# Patient Record
Sex: MALE | Race: OTHER | HISPANIC OR LATINO | URBAN - METROPOLITAN AREA
[De-identification: names, ages, dates, MRNs, and addresses within clinical notes are randomized per-mention and may not be internally consistent; named-entity substitution may affect disease eponyms.]

---

## 2017-11-27 ENCOUNTER — INPATIENT (INPATIENT)
Facility: HOSPITAL | Age: 32
LOS: 1 days | Discharge: HOME | DRG: 511 | End: 2017-11-29
Attending: ORTHOPAEDIC SURGERY | Admitting: ORTHOPAEDIC SURGERY
Payer: OTHER MISCELLANEOUS

## 2017-11-27 VITALS
RESPIRATION RATE: 16 BRPM | HEART RATE: 77 BPM | TEMPERATURE: 98 F | SYSTOLIC BLOOD PRESSURE: 146 MMHG | DIASTOLIC BLOOD PRESSURE: 95 MMHG | OXYGEN SATURATION: 97 %

## 2017-11-27 DIAGNOSIS — S52.502A UNSPECIFIED FRACTURE OF THE LOWER END OF LEFT RADIUS, INITIAL ENCOUNTER FOR CLOSED FRACTURE: ICD-10-CM

## 2017-11-27 DIAGNOSIS — M54.2 CERVICALGIA: ICD-10-CM

## 2017-11-27 DIAGNOSIS — M54.6 PAIN IN THORACIC SPINE: ICD-10-CM

## 2017-11-27 DIAGNOSIS — S52.501A UNSPECIFIED FRACTURE OF THE LOWER END OF RIGHT RADIUS, INITIAL ENCOUNTER FOR CLOSED FRACTURE: ICD-10-CM

## 2017-11-27 LAB
ALBUMIN SERPL ELPH-MCNC: 4.4 G/DL — SIGNIFICANT CHANGE UP (ref 3.4–5)
ALP SERPL-CCNC: 95 U/L — SIGNIFICANT CHANGE UP (ref 40–120)
ALT FLD-CCNC: 36 U/L — SIGNIFICANT CHANGE UP (ref 12–42)
ANION GAP SERPL CALC-SCNC: 12 MMOL/L — SIGNIFICANT CHANGE UP (ref 9–16)
APTT BLD: 32.6 SEC — SIGNIFICANT CHANGE UP (ref 27.5–36.5)
AST SERPL-CCNC: 18 U/L — SIGNIFICANT CHANGE UP (ref 15–37)
BASOPHILS NFR BLD AUTO: 0.8 % — SIGNIFICANT CHANGE UP (ref 0–2)
BILIRUB SERPL-MCNC: 0.2 MG/DL — SIGNIFICANT CHANGE UP (ref 0.2–1.2)
BUN SERPL-MCNC: 18 MG/DL — SIGNIFICANT CHANGE UP (ref 7–23)
CALCIUM SERPL-MCNC: 9.7 MG/DL — SIGNIFICANT CHANGE UP (ref 8.5–10.5)
CHLORIDE SERPL-SCNC: 102 MMOL/L — SIGNIFICANT CHANGE UP (ref 96–108)
CO2 SERPL-SCNC: 26 MMOL/L — SIGNIFICANT CHANGE UP (ref 22–31)
CREAT SERPL-MCNC: 0.98 MG/DL — SIGNIFICANT CHANGE UP (ref 0.5–1.3)
EOSINOPHIL NFR BLD AUTO: 0.9 % — SIGNIFICANT CHANGE UP (ref 0–6)
GLUCOSE SERPL-MCNC: 115 MG/DL — HIGH (ref 70–99)
HCT VFR BLD CALC: 43.4 % — SIGNIFICANT CHANGE UP (ref 39–50)
HGB BLD-MCNC: 15.1 G/DL — SIGNIFICANT CHANGE UP (ref 13–17)
IMM GRANULOCYTES NFR BLD AUTO: 0.6 % — SIGNIFICANT CHANGE UP (ref 0–1.5)
INR BLD: 0.96 — SIGNIFICANT CHANGE UP (ref 0.88–1.16)
LYMPHOCYTES # BLD AUTO: 35.6 % — SIGNIFICANT CHANGE UP (ref 13–44)
MCHC RBC-ENTMCNC: 30.5 PG — SIGNIFICANT CHANGE UP (ref 27–34)
MCHC RBC-ENTMCNC: 34.8 G/DL — SIGNIFICANT CHANGE UP (ref 32–36)
MCV RBC AUTO: 87.7 FL — SIGNIFICANT CHANGE UP (ref 80–100)
MONOCYTES NFR BLD AUTO: 6.3 % — SIGNIFICANT CHANGE UP (ref 2–14)
NEUTROPHILS NFR BLD AUTO: 55.8 % — SIGNIFICANT CHANGE UP (ref 43–77)
PCO2 BLDV: 45 MMHG — SIGNIFICANT CHANGE UP (ref 41–51)
PH BLDV: 7.37 — SIGNIFICANT CHANGE UP (ref 7.32–7.43)
PLATELET # BLD AUTO: 272 K/UL — SIGNIFICANT CHANGE UP (ref 150–400)
PO2 BLDV: 84 MMHG — HIGH (ref 35–40)
POTASSIUM SERPL-MCNC: 3.7 MMOL/L — SIGNIFICANT CHANGE UP (ref 3.5–5.3)
POTASSIUM SERPL-SCNC: 3.7 MMOL/L — SIGNIFICANT CHANGE UP (ref 3.5–5.3)
PROT SERPL-MCNC: 7.9 G/DL — SIGNIFICANT CHANGE UP (ref 6.4–8.2)
PROTHROM AB SERPL-ACNC: 10.6 SEC — SIGNIFICANT CHANGE UP (ref 9.8–12.7)
RBC # BLD: 4.95 M/UL — SIGNIFICANT CHANGE UP (ref 4.2–5.8)
RBC # FLD: 11.6 % — SIGNIFICANT CHANGE UP (ref 10.3–16.9)
SAO2 % BLDV: 97 % — SIGNIFICANT CHANGE UP
SODIUM SERPL-SCNC: 140 MMOL/L — SIGNIFICANT CHANGE UP (ref 132–145)
WBC # BLD: 10.8 K/UL — HIGH (ref 3.8–10.5)
WBC # FLD AUTO: 10.8 K/UL — HIGH (ref 3.8–10.5)

## 2017-11-27 PROCEDURE — 73552 X-RAY EXAM OF FEMUR 2/>: CPT | Mod: 26,RT

## 2017-11-27 PROCEDURE — 74177 CT ABD & PELVIS W/CONTRAST: CPT | Mod: 26

## 2017-11-27 PROCEDURE — 70450 CT HEAD/BRAIN W/O DYE: CPT | Mod: 26

## 2017-11-27 PROCEDURE — 29125 APPL SHORT ARM SPLINT STATIC: CPT | Mod: LT

## 2017-11-27 PROCEDURE — 73564 X-RAY EXAM KNEE 4 OR MORE: CPT | Mod: 26,RT

## 2017-11-27 PROCEDURE — 99291 CRITICAL CARE FIRST HOUR: CPT | Mod: 25

## 2017-11-27 PROCEDURE — 71260 CT THORAX DX C+: CPT | Mod: 26

## 2017-11-27 PROCEDURE — 72170 X-RAY EXAM OF PELVIS: CPT | Mod: 26

## 2017-11-27 PROCEDURE — 73090 X-RAY EXAM OF FOREARM: CPT | Mod: 26,50

## 2017-11-27 PROCEDURE — 73590 X-RAY EXAM OF LOWER LEG: CPT | Mod: 26,RT

## 2017-11-27 PROCEDURE — 99285 EMERGENCY DEPT VISIT HI MDM: CPT | Mod: 57,25

## 2017-11-27 PROCEDURE — 71010: CPT | Mod: 26

## 2017-11-27 PROCEDURE — 72131 CT LUMBAR SPINE W/O DYE: CPT | Mod: 26

## 2017-11-27 PROCEDURE — 72128 CT CHEST SPINE W/O DYE: CPT | Mod: 26

## 2017-11-27 PROCEDURE — 73110 X-RAY EXAM OF WRIST: CPT | Mod: 26,LT,RT

## 2017-11-27 PROCEDURE — 73080 X-RAY EXAM OF ELBOW: CPT | Mod: 26,50

## 2017-11-27 PROCEDURE — 73110 X-RAY EXAM OF WRIST: CPT | Mod: 26,50,76

## 2017-11-27 PROCEDURE — 25605 CLTX DST RDL FX/EPHYS SEP W/: CPT | Mod: RT

## 2017-11-27 PROCEDURE — 73080 X-RAY EXAM OF ELBOW: CPT | Mod: 26,LT,RT

## 2017-11-27 PROCEDURE — 72125 CT NECK SPINE W/O DYE: CPT | Mod: 26

## 2017-11-27 RX ORDER — MORPHINE SULFATE 50 MG/1
2 CAPSULE, EXTENDED RELEASE ORAL EVERY 4 HOURS
Qty: 0 | Refills: 0 | Status: DISCONTINUED | OUTPATIENT
Start: 2017-11-27 | End: 2017-11-29

## 2017-11-27 RX ORDER — HYDROMORPHONE HYDROCHLORIDE 2 MG/ML
1 INJECTION INTRAMUSCULAR; INTRAVENOUS; SUBCUTANEOUS ONCE
Qty: 0 | Refills: 0 | Status: DISCONTINUED | OUTPATIENT
Start: 2017-11-27 | End: 2017-11-27

## 2017-11-27 RX ORDER — METOCLOPRAMIDE HCL 10 MG
10 TABLET ORAL EVERY 6 HOURS
Qty: 0 | Refills: 0 | Status: DISCONTINUED | OUTPATIENT
Start: 2017-11-27 | End: 2017-11-29

## 2017-11-27 RX ORDER — DOCUSATE SODIUM 100 MG
100 CAPSULE ORAL THREE TIMES A DAY
Qty: 0 | Refills: 0 | Status: DISCONTINUED | OUTPATIENT
Start: 2017-11-27 | End: 2017-11-29

## 2017-11-27 RX ORDER — ACETAMINOPHEN 500 MG
650 TABLET ORAL EVERY 6 HOURS
Qty: 0 | Refills: 0 | Status: DISCONTINUED | OUTPATIENT
Start: 2017-11-27 | End: 2017-11-29

## 2017-11-27 RX ORDER — INFLUENZA VIRUS VACCINE 15; 15; 15; 15 UG/.5ML; UG/.5ML; UG/.5ML; UG/.5ML
0.5 SUSPENSION INTRAMUSCULAR ONCE
Qty: 0 | Refills: 0 | Status: COMPLETED | OUTPATIENT
Start: 2017-11-27 | End: 2017-11-27

## 2017-11-27 RX ORDER — ONDANSETRON 8 MG/1
4 TABLET, FILM COATED ORAL ONCE
Qty: 0 | Refills: 0 | Status: COMPLETED | OUTPATIENT
Start: 2017-11-27 | End: 2017-11-27

## 2017-11-27 RX ORDER — OXYCODONE HYDROCHLORIDE 5 MG/1
10 TABLET ORAL EVERY 4 HOURS
Qty: 0 | Refills: 0 | Status: DISCONTINUED | OUTPATIENT
Start: 2017-11-27 | End: 2017-11-29

## 2017-11-27 RX ORDER — MORPHINE SULFATE 50 MG/1
4 CAPSULE, EXTENDED RELEASE ORAL ONCE
Qty: 0 | Refills: 0 | Status: DISCONTINUED | OUTPATIENT
Start: 2017-11-27 | End: 2017-11-27

## 2017-11-27 RX ORDER — SODIUM CHLORIDE 9 MG/ML
1000 INJECTION, SOLUTION INTRAVENOUS
Qty: 0 | Refills: 0 | Status: DISCONTINUED | OUTPATIENT
Start: 2017-11-27 | End: 2017-11-29

## 2017-11-27 RX ORDER — HYDROMORPHONE HYDROCHLORIDE 2 MG/ML
0.5 INJECTION INTRAMUSCULAR; INTRAVENOUS; SUBCUTANEOUS EVERY 4 HOURS
Qty: 0 | Refills: 0 | Status: DISCONTINUED | OUTPATIENT
Start: 2017-11-27 | End: 2017-11-29

## 2017-11-27 RX ORDER — OXYCODONE HYDROCHLORIDE 5 MG/1
5 TABLET ORAL EVERY 4 HOURS
Qty: 0 | Refills: 0 | Status: DISCONTINUED | OUTPATIENT
Start: 2017-11-27 | End: 2017-11-29

## 2017-11-27 RX ADMIN — HYDROMORPHONE HYDROCHLORIDE 1 MILLIGRAM(S): 2 INJECTION INTRAMUSCULAR; INTRAVENOUS; SUBCUTANEOUS at 18:24

## 2017-11-27 RX ADMIN — MORPHINE SULFATE 4 MILLIGRAM(S): 50 CAPSULE, EXTENDED RELEASE ORAL at 16:06

## 2017-11-27 RX ADMIN — MORPHINE SULFATE 4 MILLIGRAM(S): 50 CAPSULE, EXTENDED RELEASE ORAL at 15:43

## 2017-11-27 RX ADMIN — HYDROMORPHONE HYDROCHLORIDE 0.5 MILLIGRAM(S): 2 INJECTION INTRAMUSCULAR; INTRAVENOUS; SUBCUTANEOUS at 23:00

## 2017-11-27 RX ADMIN — HYDROMORPHONE HYDROCHLORIDE 0.5 MILLIGRAM(S): 2 INJECTION INTRAMUSCULAR; INTRAVENOUS; SUBCUTANEOUS at 22:39

## 2017-11-27 RX ADMIN — HYDROMORPHONE HYDROCHLORIDE 1 MILLIGRAM(S): 2 INJECTION INTRAMUSCULAR; INTRAVENOUS; SUBCUTANEOUS at 18:19

## 2017-11-27 RX ADMIN — HYDROMORPHONE HYDROCHLORIDE 1 MILLIGRAM(S): 2 INJECTION INTRAMUSCULAR; INTRAVENOUS; SUBCUTANEOUS at 21:07

## 2017-11-27 RX ADMIN — ONDANSETRON 4 MILLIGRAM(S): 8 TABLET, FILM COATED ORAL at 21:12

## 2017-11-27 RX ADMIN — HYDROMORPHONE HYDROCHLORIDE 1 MILLIGRAM(S): 2 INJECTION INTRAMUSCULAR; INTRAVENOUS; SUBCUTANEOUS at 17:48

## 2017-11-27 RX ADMIN — HYDROMORPHONE HYDROCHLORIDE 1 MILLIGRAM(S): 2 INJECTION INTRAMUSCULAR; INTRAVENOUS; SUBCUTANEOUS at 16:04

## 2017-11-27 NOTE — ED PROVIDER NOTE - DIAGNOSTIC INTERPRETATION
ER Physician: Huber Bailey CHEST XRAY INTERPRETATION: lungs clear, heart shadow normal, bony structures intact, ER Physician: Huber Bailey BILATERAL WRIST INTERPRETATION:  distal radius fracture to L, distal radius and ulna fx to R, soft tissue swelling noted; abnormal bony alignment. ER Physician: Huber Bailey PELVIS XRAY INTERPRETATION:  no acute fracture; no soft tissue swelling noted; normal bony alignment. ER Physician: Huber Bailey BL ELBOW XRAY INTERPRETATION:  no acute fracture; no soft tissue swelling noted; normal bony alignment.   ER Physician: Huber BARKER TIBFIB XRAY INTERPRETATION:  no acute fracture; no soft tissue swelling noted; normal bony alignment. ER Physician: Huber BARKER FEMUR XRAY INTERPRETATION:  no acute fracture; no soft tissue swelling noted; normal bony alignment.

## 2017-11-27 NOTE — PROGRESS NOTE ADULT - SUBJECTIVE AND OBJECTIVE BOX
Ortho Preop Note    Patient is a 32y old  Male who presents with a chief complaint of   Diagnosis: b/l DR Champion  Procedure: B/L DR MRAIEE  Surgeon: Aminata                          15.1   10.8  )-----------( 272      ( 27 Nov 2017 15:41 )             43.4     11-27    140  |  102  |  18  ----------------------------<  115<H>  3.7   |  26  |  0.98    Ca    9.7      27 Nov 2017 15:41    TPro  7.9  /  Alb  4.4  /  TBili  0.2  /  DBili  x   /  AST  18  /  ALT  36  /  AlkPhos  95  11-27    PT/INR - ( 27 Nov 2017 15:41 )   PT: 10.6 sec;   INR: 0.96          PTT - ( 27 Nov 2017 15:41 )  PTT:32.6 sec      [ ] Type & Screen  [x ] CBC  [x ] BMP  [x ] PT/PTT/INR  [ ] Urinalysis  [x ] Chest X-ray  [x ] EKG  [x ] NPO/IVF  [x ] Consent  [ ] Clearance in am  [ x] Added on to OR Schedule  [ x] Anti-coagulation held      Assessment & Plan:  32yMale with  patito/l DR fxs  -For OR 11/27

## 2017-11-27 NOTE — ED PROVIDER NOTE - CARE PLAN
Principal Discharge DX:	Distal radius fracture  Goal:	left and right  Secondary Diagnosis:	Ulnar fracture  Secondary Diagnosis:	Fall

## 2017-11-27 NOTE — ED ADULT NURSE REASSESSMENT NOTE - NS ED NURSE REASSESS COMMENT FT1
Patient continues to complaint of generalized pain to lower back and arms. Patient hemodynamically stable. Continues to complain of pain. MD Bailey notified and provided verbal order for 1mg of IV Hydromorphone. Patient medicated per order. Will continue to monitor. Patient presents alert and oriented X 4. Primarily Brazilian speaking. HEENT intact. +PERRLA. MMM, Trachea midline. No C-Spine tenderness. C-Collar placed. No JVD. Patient LS clear to ausculation X 4. Equal bilateral chest rise. S1, S2. No crepitus or signs of trauma to chest. Abdomen soft and tender to palpation. Abrasion to right back. +BS X 4. Skin warm, dry, unremarkable, and intact. +Circulation to all four extremities with decreased motor strength secondary to pain. Obvious deformity and to right wrist. Left wrist with pain. Pelvis intact, no crepitus. Patient with pain to right knee. Abrasion right knee and left lower shin. Patient provided for emotional support, comfort, frequent rounding, and review of plan of care. Will continue to monitor. Patient continues to complaint of generalized pain to lower back and arms. Patient hemodynamically stable. Continues to complain of pain. MD Bailey notified and provided verbal order for 1mg of IV Hydromorphone. Patient medicated per order. Will continue to monitor. Patient presents alert and oriented X 4. Primarily Citizen of [place] speaking. HEENT intact. +PERRLA. MMM, Trachea midline. No C-Spine tenderness. C-Collar placed. No JVD. Patient LS clear to ausculation X 4. Equal bilateral chest rise. S1, S2. No crepitus or signs of trauma to chest. Abdomen soft and tender to palpation. Abrasion to right back. +BS X 4. Skin warm, dry, unremarkable, and intact other than documented below. +Circulation to all four extremities with decreased motor strength secondary to pain. Obvious deformity and to right wrist. Left wrist with pain. Pelvis intact, no crepitus. Patient with pain to right knee. Abrasion right knee and left lower shin. Patient provided for emotional support, comfort, frequent rounding, and review of plan of care. Will continue to monitor.

## 2017-11-27 NOTE — ED PROVIDER NOTE - PHYSICAL EXAMINATION
CON: ao x 3, HENMT: clear oropharynx, soft neck, no cervical midline tenderness or stepoff, c-spine collar applied, HEAD: no palpable hematoma, CV: rrr, equal pulses b/l, RESP: cta b/l, GI: +BS, soft, nontender, no rebound, no guarding, SKIN: noted abrasion to upper back, MSK: deformity noted to R wrist, pulses intact, mild swelling noted to L wrist, pulses intact, b/l pedal pulses intact, pelvis stable, no deformity noted to LE b/l, NEURO: ao x 4, follows commands

## 2017-11-27 NOTE — ED PROVIDER NOTE - PROGRESS NOTE DETAILS
pt initially fell from height, eval for traumatic injury, CT w/o acute intrathoracic or intraabd injury, spine films w/o acute fx, appears only isolated wrist/orthopedic injury, no indication for additional trauma eval/admission, d/w orthopedic attending in house, feels comfortable w admission for operative management of wrist

## 2017-11-27 NOTE — ED PROVIDER NOTE - MEDICAL DECISION MAKING DETAILS
abc intact, pulses intact in b/l UE and LE extremities, pt placed in c-spine precaution upon arrival, no c/t/l midline stepoff or deformity, rectal exam w/ tone no blood, soft abd, no guarding, pelvis stable, will need trauma workup, xray extremities and pelvis and chest, ortho consult as needed

## 2017-11-27 NOTE — ED ADULT TRIAGE NOTE - CHIEF COMPLAINT QUOTE
Patient feel 8-10ft off a scaffold, landing prone. Bilateral arm pain and deformity. Wear hard hat at time

## 2017-11-27 NOTE — CONSULT NOTE ADULT - ATTENDING COMMENTS
I personally performed the above history and physical.  With IV pain medications and a hematoma block, a closed reduction of bilateral distal radius fractures was performed with the assistance of Dr. Garry Casas.  Dr. Casas assisted in splint application and molding bilaterally.  Following placement of splints, neurovascular exam was performed and remained within normal limits bilaterally.    Ja Coates MD

## 2017-11-28 DIAGNOSIS — Z01.818 ENCOUNTER FOR OTHER PREPROCEDURAL EXAMINATION: ICD-10-CM

## 2017-11-28 DIAGNOSIS — S52.502A UNSPECIFIED FRACTURE OF THE LOWER END OF LEFT RADIUS, INITIAL ENCOUNTER FOR CLOSED FRACTURE: ICD-10-CM

## 2017-11-28 DIAGNOSIS — S52.501A UNSPECIFIED FRACTURE OF THE LOWER END OF RIGHT RADIUS, INITIAL ENCOUNTER FOR CLOSED FRACTURE: ICD-10-CM

## 2017-11-28 LAB
ANION GAP SERPL CALC-SCNC: 17 MMOL/L — SIGNIFICANT CHANGE UP (ref 5–17)
APPEARANCE UR: CLEAR — SIGNIFICANT CHANGE UP
BASOPHILS NFR BLD AUTO: 0.2 % — SIGNIFICANT CHANGE UP (ref 0–2)
BILIRUB UR-MCNC: NEGATIVE — SIGNIFICANT CHANGE UP
BLD GP AB SCN SERPL QL: NEGATIVE — SIGNIFICANT CHANGE UP
BUN SERPL-MCNC: 17 MG/DL — SIGNIFICANT CHANGE UP (ref 7–23)
CALCIUM SERPL-MCNC: 9.2 MG/DL — SIGNIFICANT CHANGE UP (ref 8.4–10.5)
CHLORIDE SERPL-SCNC: 94 MMOL/L — LOW (ref 96–108)
CO2 SERPL-SCNC: 25 MMOL/L — SIGNIFICANT CHANGE UP (ref 22–31)
COLOR SPEC: YELLOW — SIGNIFICANT CHANGE UP
CREAT SERPL-MCNC: 0.8 MG/DL — SIGNIFICANT CHANGE UP (ref 0.5–1.3)
DIFF PNL FLD: NEGATIVE — SIGNIFICANT CHANGE UP
EOSINOPHIL NFR BLD AUTO: 0.3 % — SIGNIFICANT CHANGE UP (ref 0–6)
GLUCOSE SERPL-MCNC: 126 MG/DL — HIGH (ref 70–99)
GLUCOSE UR QL: NEGATIVE — SIGNIFICANT CHANGE UP
HCT VFR BLD CALC: 41.2 % — SIGNIFICANT CHANGE UP (ref 39–50)
HGB BLD-MCNC: 14 G/DL — SIGNIFICANT CHANGE UP (ref 13–17)
KETONES UR-MCNC: NEGATIVE — SIGNIFICANT CHANGE UP
LEUKOCYTE ESTERASE UR-ACNC: NEGATIVE — SIGNIFICANT CHANGE UP
LYMPHOCYTES # BLD AUTO: 18.5 % — SIGNIFICANT CHANGE UP (ref 13–44)
MCHC RBC-ENTMCNC: 30.4 PG — SIGNIFICANT CHANGE UP (ref 27–34)
MCHC RBC-ENTMCNC: 34 G/DL — SIGNIFICANT CHANGE UP (ref 32–36)
MCV RBC AUTO: 89.4 FL — SIGNIFICANT CHANGE UP (ref 80–100)
MONOCYTES NFR BLD AUTO: 7.9 % — SIGNIFICANT CHANGE UP (ref 2–14)
NEUTROPHILS NFR BLD AUTO: 73.1 % — SIGNIFICANT CHANGE UP (ref 43–77)
NITRITE UR-MCNC: NEGATIVE — SIGNIFICANT CHANGE UP
PH UR: 5 — SIGNIFICANT CHANGE UP (ref 5–8)
PLATELET # BLD AUTO: 248 K/UL — SIGNIFICANT CHANGE UP (ref 150–400)
POTASSIUM SERPL-MCNC: 3.7 MMOL/L — SIGNIFICANT CHANGE UP (ref 3.5–5.3)
POTASSIUM SERPL-SCNC: 3.7 MMOL/L — SIGNIFICANT CHANGE UP (ref 3.5–5.3)
PROT UR-MCNC: NEGATIVE MG/DL — SIGNIFICANT CHANGE UP
RBC # BLD: 4.61 M/UL — SIGNIFICANT CHANGE UP (ref 4.2–5.8)
RBC # FLD: 12.2 % — SIGNIFICANT CHANGE UP (ref 10.3–16.9)
RH IG SCN BLD-IMP: POSITIVE — SIGNIFICANT CHANGE UP
SODIUM SERPL-SCNC: 136 MMOL/L — SIGNIFICANT CHANGE UP (ref 135–145)
SP GR SPEC: 1.02 — SIGNIFICANT CHANGE UP (ref 1–1.03)
UROBILINOGEN FLD QL: 0.2 E.U./DL — SIGNIFICANT CHANGE UP
WBC # BLD: 11.5 K/UL — HIGH (ref 3.8–10.5)
WBC # FLD AUTO: 11.5 K/UL — HIGH (ref 3.8–10.5)

## 2017-11-28 PROCEDURE — 93010 ELECTROCARDIOGRAM REPORT: CPT

## 2017-11-28 PROCEDURE — 25609 OPTX DST RD XART FX/EP SEP3+: CPT | Mod: 58,RT,GC

## 2017-11-28 PROCEDURE — 99254 IP/OBS CNSLTJ NEW/EST MOD 60: CPT | Mod: GC

## 2017-11-28 PROCEDURE — 73100 X-RAY EXAM OF WRIST: CPT | Mod: 26,50

## 2017-11-28 PROCEDURE — 25608 OPTX DST RD XART FX/EPI SEP2: CPT | Mod: LT,GC

## 2017-11-28 PROCEDURE — 99223 1ST HOSP IP/OBS HIGH 75: CPT | Mod: 57

## 2017-11-28 RX ORDER — HYDROMORPHONE HYDROCHLORIDE 2 MG/ML
0.5 INJECTION INTRAMUSCULAR; INTRAVENOUS; SUBCUTANEOUS
Qty: 0 | Refills: 0 | Status: DISCONTINUED | OUTPATIENT
Start: 2017-11-28 | End: 2017-11-29

## 2017-11-28 RX ORDER — CEFAZOLIN SODIUM 1 G
2000 VIAL (EA) INJECTION EVERY 8 HOURS
Qty: 0 | Refills: 0 | Status: DISCONTINUED | OUTPATIENT
Start: 2017-11-28 | End: 2017-11-28

## 2017-11-28 RX ORDER — CEFAZOLIN SODIUM 1 G
2000 VIAL (EA) INJECTION EVERY 8 HOURS
Qty: 0 | Refills: 0 | Status: COMPLETED | OUTPATIENT
Start: 2017-11-29 | End: 2017-11-29

## 2017-11-28 RX ADMIN — MORPHINE SULFATE 2 MILLIGRAM(S): 50 CAPSULE, EXTENDED RELEASE ORAL at 05:35

## 2017-11-28 RX ADMIN — MORPHINE SULFATE 2 MILLIGRAM(S): 50 CAPSULE, EXTENDED RELEASE ORAL at 09:41

## 2017-11-28 RX ADMIN — OXYCODONE HYDROCHLORIDE 10 MILLIGRAM(S): 5 TABLET ORAL at 01:44

## 2017-11-28 RX ADMIN — OXYCODONE HYDROCHLORIDE 10 MILLIGRAM(S): 5 TABLET ORAL at 07:15

## 2017-11-28 RX ADMIN — OXYCODONE HYDROCHLORIDE 10 MILLIGRAM(S): 5 TABLET ORAL at 00:55

## 2017-11-28 RX ADMIN — HYDROMORPHONE HYDROCHLORIDE 0.5 MILLIGRAM(S): 2 INJECTION INTRAMUSCULAR; INTRAVENOUS; SUBCUTANEOUS at 17:49

## 2017-11-28 RX ADMIN — MORPHINE SULFATE 2 MILLIGRAM(S): 50 CAPSULE, EXTENDED RELEASE ORAL at 05:20

## 2017-11-28 RX ADMIN — MORPHINE SULFATE 2 MILLIGRAM(S): 50 CAPSULE, EXTENDED RELEASE ORAL at 09:03

## 2017-11-28 RX ADMIN — OXYCODONE HYDROCHLORIDE 10 MILLIGRAM(S): 5 TABLET ORAL at 06:38

## 2017-11-28 NOTE — H&P ADULT - ASSESSMENT
A/P  Pt 32yMale  s/p fall with b/l DR sosa  Admit to Orthopaedics  NPO at midnight  IVF  Pain control  Hold anticoagulation for OR  Labs  UA  type and screen  CXR   EKG  Medical Clearance with Dr. Borja  Consented for b/l DR MARIEE  d/w attending

## 2017-11-28 NOTE — H&P ADULT - NSHPLABSRESULTS_GEN_ALL_CORE
Vital Signs Last 24 Hrs  T(C): 36.6 (27 Nov 2017 22:27), Max: 36.9 (27 Nov 2017 17:53)  T(F): 97.8 (27 Nov 2017 22:27), Max: 98.5 (27 Nov 2017 17:53)  HR: 84 (27 Nov 2017 22:27) (65 - 84)  BP: 178/72 (27 Nov 2017 22:27) (130/82 - 178/72)  BP(mean): --  RR: 18 (27 Nov 2017 22:27) (16 - 20)  SpO2: 96% (27 Nov 2017 22:27) (95% - 99%)                          15.1   10.8  )-----------( 272      ( 27 Nov 2017 15:41 )             43.4     11-27    140  |  102  |  18  ----------------------------<  115<H>  3.7   |  26  |  0.98    Ca    9.7      27 Nov 2017 15:41    TPro  7.9  /  Alb  4.4  /  TBili  0.2  /  DBili  x   /  AST  18  /  ALT  36  /  AlkPhos  95  11-27      PT/INR - ( 27 Nov 2017 15:41 )   PT: 10.6 sec;   INR: 0.96          PTT - ( 27 Nov 2017 15:41 )  PTT:32.6 sec    XR b/l wrist: L wrist non-displaced intra-articular DR fx, R wrist comminuted, intra-articular dorsally angulated DR fx    < from: CT Lumbar Spine No Cont (11.27.17 @ 16:52) >    IMPRESSION: -    1. No fracture or subluxation.    2. At L5-S1 level, slight posterior disc bulging is noted.    < end of copied text >      < from: CT Thoracic Spine No Cont (11.27.17 @ 16:58) >      IMPRESSION:-    Negative study.      < end of copied text >      < from: CT Cervical Spine No Cont (11.27.17 @ 16:08) >    IMPRESSION: -    1.  No fracture or subluxation.     2.  Minimal levoscoliosis is noted, probably due to posturing.    < end of copied text >      < from: CT Head No Cont (11.27.17 @ 16:08) >    IMPRESSION:     No acute intracranial hemorrhage or calvarial fracture.    Maxillary sinus inflammatory disease, as above.    < end of copied text >

## 2017-11-28 NOTE — PROGRESS NOTE ADULT - SUBJECTIVE AND OBJECTIVE BOX
Orthopaedics Post Op Check    Procedure: b/l DR MARIEE  Surgeon: Dr Coates    Pt comfortable, without complaints  Denies CP, SOB, N/V, numbness/tingling     Vital Signs Last 24 Hrs  T(C): 37.3 (28 Nov 2017 17:25), Max: 37.6 (28 Nov 2017 05:18)  T(F): 99.2 (28 Nov 2017 17:25), Max: 99.7 (28 Nov 2017 05:18)  HR: 86 (28 Nov 2017 18:40) (80 - 104)  BP: 159/73 (28 Nov 2017 18:40) (130/82 - 178/72)  BP(mean): 103 (28 Nov 2017 18:40) (101 - 108)  RR: 15 (28 Nov 2017 18:40) (14 - 32)  SpO2: 97% (28 Nov 2017 18:40) (94% - 97%)  AVSS, NAD    Dressing C/D/I w/ b/l Splints  General: Pt Alert and oriented   wwp  able to range fingers b/l w/o deficits  silt b/l                             14.0   11.5  )-----------( 248      ( 28 Nov 2017 06:49 )             41.2   11-28    136  |  94<L>  |  17  ----------------------------<  126<H>  3.7   |  25  |  0.80    Ca    9.2      28 Nov 2017 06:49    TPro  7.9  /  Alb  4.4  /  TBili  0.2  /  DBili  x   /  AST  18  /  ALT  36  /  AlkPhos  95  11-27      A/P: 32yMale POD#0 s/p above  - Stable  - Pain Control  - DVT ppx  - Post op abx  - PT, WBS: pwb R, nwb L  - F/U AM Labs Orthopaedics Post Op Check    Procedure: b/l DR MARIEE  Surgeon: Dr Coates    Pt comfortable, without complaints  Denies CP, SOB, N/V, numbness/tingling     Vital Signs Last 24 Hrs  T(C): 37.3 (28 Nov 2017 17:25), Max: 37.6 (28 Nov 2017 05:18)  T(F): 99.2 (28 Nov 2017 17:25), Max: 99.7 (28 Nov 2017 05:18)  HR: 86 (28 Nov 2017 18:40) (80 - 104)  BP: 159/73 (28 Nov 2017 18:40) (130/82 - 178/72)  BP(mean): 103 (28 Nov 2017 18:40) (101 - 108)  RR: 15 (28 Nov 2017 18:40) (14 - 32)  SpO2: 97% (28 Nov 2017 18:40) (94% - 97%)  AVSS, NAD    Dressing C/D/I w/ b/l Splints  General: Pt Alert and oriented   wwp  able to range fingers b/l w/o deficits  silt b/l                             14.0   11.5  )-----------( 248      ( 28 Nov 2017 06:49 )             41.2   11-28    136  |  94<L>  |  17  ----------------------------<  126<H>  3.7   |  25  |  0.80    Ca    9.2      28 Nov 2017 06:49    TPro  7.9  /  Alb  4.4  /  TBili  0.2  /  DBili  x   /  AST  18  /  ALT  36  /  AlkPhos  95  11-27      A/P: 32yMale POD#0 s/p above  - emphasized elevation b/l + added pillows w/ PACU nurse  - Stable  - Pain Control  - DVT ppx  - Post op abx  - PT, WBS: pwb L, nwb R  - F/U AM Labs

## 2017-11-28 NOTE — CONSULT NOTE ADULT - PROBLEM SELECTOR PROBLEM 1
Preoperative clearance
Traumatic closed displaced fracture of distal end of right radius and ulna, initial encounter

## 2017-11-28 NOTE — CONSULT NOTE ADULT - PROBLEM SELECTOR PROBLEM 2
Distal radius fracture, left
Traumatic closed displaced fracture of distal end of radius, left, initial encounter

## 2017-11-28 NOTE — CONSULT NOTE ADULT - SUBJECTIVE AND OBJECTIVE BOX
HPI:  32-year-old adult male presented to the [facility] emergency room after falling from a football scaffolding.  Patient also complained of midthoracic central spinal pain which is made worse with taking deep breaths. No other associated symptoms. No aggravating or alleviating factors. No prior treatment.  Patient denies any other pain at rest. He did report pain with palpation or motion of the cervical spine which is midline in the mid cervical region. Again denies any neurologic deficits to the bilateral upper bilateral lower extremities. He denies any change in vision, smell, taste. Denies any change in balance. Denies any loss of memory.    PMH: Noncontributory    PSH: Right knee surgery unclear what type    NKDA    Social history- 1 month no cigarettes, 3 months no ETOH     Family hx: noncontributory         Patient is a 32y old  Male who presents with a chief complaint of b/l wrist pain (2017 00:08)      INTERVAL HPI/OVERNIGHT EVENTS: No acute overnight events.     Review of Systems: 12 point review of systems otherwise negative  ( - )fevers/chills  ( - ) dyspnea  ( - ) cough  ( - ) chest pain  ( - ) palpatations  ( - ) dizziness/lightheadedness  ( - ) nausea/vomiting  ( - ) abd pain  ( - ) diarrhea  ( - ) melena  ( - ) hematochezia  ( - ) dysuria  ( - ) hematuria  ( - ) leg swelling  ( -) calf tenderness  ( - ) motor weakness  ( - ) extremity numbness  ( - ) back pain  ( + ) tolerating POs  ( + ) BM    MEDICATIONS  (STANDING):  docusate sodium 100 milliGRAM(s) Oral three times a day  influenza   Vaccine 0.5 milliLiter(s) IntraMuscular once  lactated ringers. 1000 milliLiter(s) (80 mL/Hr) IV Continuous <Continuous>    MEDICATIONS  (PRN):  acetaminophen   Tablet 650 milliGRAM(s) Oral every 6 hours PRN For Temp greater than 38 C (100.4 F)  HYDROmorphone  Injectable 0.5 milliGRAM(s) IV Push every 4 hours PRN Breakthrough pain  metoclopramide Injectable 10 milliGRAM(s) IV Push every 6 hours PRN Nausea and/or Vomiting  morphine  - Injectable 2 milliGRAM(s) IV Push every 4 hours PRN Severe Pain (7 - 10)  oxyCODONE    IR 10 milliGRAM(s) Oral every 4 hours PRN Moderate Pain  oxyCODONE    IR 5 milliGRAM(s) Oral every 4 hours PRN Mild Pain      Allergies    No Known Allergies    Intolerances          Vital Signs Last 24 Hrs  T(C): 36.7 (2017 08:37), Max: 37.6 (2017 05:18)  T(F): 98 (2017 08:37), Max: 99.7 (2017 05:18)  HR: 80 (2017 08:37) (65 - 84)  BP: 139/78 (2017 08:37) (130/82 - 178/72)  BP(mean): --  RR: 17 (2017 08:37) (16 - 20)  SpO2: 96% (2017 08:37) (95% - 99%)  CAPILLARY BLOOD GLUCOSE            Physical Exam:    Daily     Daily   General:  Well appearing, NAD, not cachetic  HEENT:  Nonicteric, PERRLA  CV:  RRR, no murmur, no JVD  Lungs:  CTA B/L, no wheezes, rales, rhonchi  Abdomen:  Soft, non-tender, no distended, positive BS, no hepatosplenomegaly  Skin:  Warm and dry, no rashes  :  No mann  Neuro:  AAOx3, non-focal, CN II-XII grossly intact  No Restraints    LABS:                        14.0   11.5  )-----------( 248      ( 2017 06:49 )             41.2     11-28    136  |  94<L>  |  17  ----------------------------<  126<H>  3.7   |  25  |  0.80    Ca    9.2      2017 06:49    TPro  7.9  /  Alb  4.4  /  TBili  0.2  /  DBili  x   /  AST  18  /  ALT  36  /  AlkPhos  95  11-27    PT/INR - ( 2017 15:41 )   PT: 10.6 sec;   INR: 0.96          PTT - ( 2017 15:41 )  PTT:32.6 sec  Urinalysis Basic - ( 2017 23:23 )    Color: Yellow / Appearance: Clear / S.025 / pH: x  Gluc: x / Ketone: NEGATIVE  / Bili: Negative / Urobili: 0.2 E.U./dL   Blood: x / Protein: NEGATIVE mg/dL / Nitrite: NEGATIVE   Leuk Esterase: NEGATIVE / RBC: x / WBC x   Sq Epi: x / Non Sq Epi: x / Bacteria: x          RADIOLOGY & ADDITIONAL TESTS:    ---------------------------------------------------------------------------  I personally reviewed: [  ]EKG   [  ]CXR    [  ] CT    [  ]Other  ---------------------------------------------------------------------------  PLEASE CHECK WHEN PRESENT:     [  ]Heart Failure     [  ] Acute     [  ] Acute on Chronic     [  ] Chronic  -------------------------------------------------------------------     [  ]Diastolic [HFpEF]     [  ]Systolic [HFrEF]     [  ]Combined [HFpEF & HFrEF]     [  ]Other:  -------------------------------------------------------------------  [  ]SATYA     [  ]ATN     [  ]Reneal Medullary Necrosis     [  ]Renal Cortical Necrosis     [  ]Other Pathological Lesions:    [  ]CKD 1  [  ]CKD 2  [  ]CKD 3  [  ]CKD 4  [  ]CKD 5  [  ]Other  -------------------------------------------------------------------  [  ]Other/Unspecified:    --------------------------------------------------------------------    Abdominal Nutritional Status  [  ]Malnutrition: See Nutrition Note  [  ]Cachexia  [  ]Other:   [  ]Supplement Ordered:  [  ]Morbid Obesity (BMI >=40]
32-year-old adult male presents to the [facility] emergency room after falling from a football scaffolding. Patient reports he struck bilateral outstretched hands and protected his face, landed on his chest, sustaining an extension injury to his thoracic spine. He complains of pain in the bilateral wrists and thoracic spine but denies pain otherwise. He was taken to the emergency room where CT scan was performed of the head neck thoracic and lumbar spine as well as chest abdomen pelvis. Initial review these images by ER report were negative. Patient was immobilized in a cervical collar and orthopedic consult was placed.    At the time of evaluation patient complained of pain in the bilateral wrists which was severe. He reported a deformity and inability to bear weight in the right wrist. He denied any numbness or tingling to the fingers. Reported weakness associated with pain but no loss of motion. Denies any other symptoms. Pain is aggravated with any motion in relieved with immobilization. Treatment prior to our evaluation consisted only of IV pain control.    Patient also complained of midthoracic central spinal pain which is made worse with taking deep breaths. No other associated symptoms. No aggravating or alleviating factors. No prior treatment.    Patient denies any other pain at rest. He did report pain with palpation or motion of the cervical spine which is midline in the mid cervical region. Again denies any neurologic deficits to the bilateral upper bilateral lower extremities. He denies any change in vision, smell, taste. Denies any change in balance. Denies any loss of memory.    Review of systems otherwise negative as below.    Past medical history: Denies  Past surgical history: Right tibia intramedullary nailing, right foot surgery  Social history: Patient is from [place]. Working construction. Does not have any family in [place]. Denies any significant tobacco alcohol or drug exposures.  Medications: None prior to emergency room  Allergies: Denies    General: Patient is awake and alert, demonstrates appropriate mood and affect, exhibits normal breathing and is in no acute distress.  Psych:  The patient is currently in a hospital gown and cervical spine collar in emergency room bed, maintains good eye contact.  Alert and oriented x 3.  Normal attention/concentration, fund of knowledge and recall.  Normal speech rate and rhythm. No hallucinations, suicidal or homicidal ideations.  Demonstrates expected level of insight and judgment regarding health.  Skin: The patient has no chronic skin lesions, rashes, or ulcers.  There is no induration or erythema of uninvolved extremities.  For skin exam of involved extremity refer to detailed musculoskeletal/extremity exam.   Lymph: No cervical, axillary, or popliteal lymphadenopathy.  There is no swelling or lymphedema in uninvolved extremities, refer to detailed exam for involved limbs.  Cardiovascular: No visible jugular venous distention.  Normal point of maximal impulse without thrill.  There is brisk capillary refill in the digits of the affected extremity. They are symmetric pulses in the bilateral upper and lower extremities. S1-S2 no murmur gallop or rub with auscultation.  Respiratory: The patient is in no apparent respiratory distress. They're taking full deep breaths wirh normal excursion, without use of accessory muscles or evidence of audible wheezes or stridor without the use of a stethoscope. No rhonchi rales or wheezes with auscultation.  Neurological: 5/5 motor strength and sensation intact throughout bilateral upper and lower extremities except in the wrist where was slightly limited due to pain. Able to maintain brief 5/5 strength., Sensation is intact to light touch from C5-T1 L2-S1. 2+ deep tendon reflexes at the triceps biceps patella tendon and Achilles.   Neck: Midline mid cervical pain with palpation, no paraspinal tenderness or spasm. Immobilized in a cervical collar;  no palpable crepitus, normal alignment and lordosis, symmetric appearance, midline trachea, no thyroid hypertrophy or nodules  Musculoskeletal: None  at time of exam. Supine. Gross malalignment of right distal radius and left wrist.. normal clinical alignment of the spine. full range of motion in [bilateral] lower extremities.  Right upper extremity:  Exam of the right upper extremity reveals full pain free range of motion of the shoulder and elbow with 505 strength and no crepitus or stiffness. Examination of the wrist and hand reveals tenderness to palpation in crepitus diffusely. Skin demonstrates 2 small abrasions but no lacerations or puncture wounds. There is obvious deformity with pronation, radial shortening, and radial deviation of the distal forearm and carpus as well as an extension deformity. Isolated exam of the digits reveals full range of motion but with pain at the wrist. Sensation is intact light touch in medial radial ulnar nerve distributions. He has intact motor function in medial radial and ulnar nerves as well.  Left upper extremity:  Exam of the left upper extremity reveals full pain free range of motion of the shoulder and elbow with 5/5 strength and no crepitus or stiffness. I see examination of the left wrist and hand revealed tenderness to palpation at the radial aspect of the wrist. He is nontender at the DRUJ ulnar aspect of the wrist. There is mild swelling. Skin is intact. Despite this he has full range of motion of the wrist and flexion extension and ulnar and radial deviation. Strength is limited due to pain. He is 5 out 5 strength and full range of motion in all digits. Sensation is intact light touch in medial radial ulnar nerve distributions.  Spine:  Neck exam as noted above with midline tenderness in the mid cervical spine but otherwise no significant findings. Remainder of the spine exam reveals tenderness to palpation at the level of T7 and T8 with some associated paraspinal spasm but otherwise no tenderness and no pain with range of motion. Note crepitus or step-off the palpated. No pain or tenderness with palpation of her lumbar spine without any discomfort with range of motion.  Bilateral extremity:  Exam of bilateral lower extremities reveal absence of any skin lesions or abrasions. He is pain-free range of motion at the hips knees and ankles without crepitus stiffness or instability. His ligamentous exam of the knees is normal. He demonstrates postoperative scars on the right lower extremity from his prior open reduction internal fixation.    Imaging:  Plain x-rays of the left wrist demonstrate an intra-articular distal radius fracture with primary involvement of the styloid and associated shortening and impaction of the styloid fragment. Step-off between the lunate and scaphoid facets with extension into the metaphysis proximally. DRUJ is well located. No evidence of any carpus or metacarpal fractures were visualized.    Plain prereduction x-rays of the right wrist demonstrate a comminuted intra-articular distal radius fracture with significant extension and impaction deformity, loss of radial height, loss of radial inclination and involvement of the distal ulna as well. Visualized portions of the carpus demonstrated no visible fractures or dislocations.    Postreduction x-rays of the right wrist demonstrate interval improvement in overall alignment, radial height, radial inclination and continued severe intra-articular comminution with associated shortening and instability.    CT scan of the head neck thoracic and lumbar spine as well as AP pelvis and lower extremity imaging were all reviewed. These images do not demonstrate any bony abnormality appreciated with the exception of bilateral femoral acetabular impingement findings with crossover sizing cam lesions on the pelvis. He also demonstrates postoperative changes in the right tibia from his prior intramedullary nail fixation.    Assessment:  #1 right comminuted intra-articular distal radius and ulna fracture  #2 left intra-articular distal radius fracture with metaphyseal extension  #3 Cervical spine pain with negative CT scan and without neurologic deficit  #4 thoracic spine pain with negative CT scan and without neurologic deficit    Plan:  Patient was counseled extensively regarding the nature of his injuries. Given his occupation as a  as well as bilateral involvement with reduction internal fixation of the comminuted intra-articular right distal radius and ulnar fractures is advised. Additionally due to the severe comminution of this injury he will have to be immobilized following open treatment. In order to allow for self-care he will require additional open reduction and internal fixation of the left distal radius. This will also optimize his outcome as a  with regards to his left wrist. Following reduction it is likely that we'll be able to mobilize the wrist to allow him additional activity as opposed to treat him with close made on that side which would necessitate 24-hour care.    With regards to the cervical and thoracic spine he remained immobilized in a cervical collar overnight with interval examination tomorrow morning. If he continues to complain of midline tenderness he will await further potentially with MRI or flexion extension views. The pain resolves given his negative CT scan we can discontinue his cervical collar and C-spine precautions.    Patient will be n.p.o. upon his arrival to establish IV access and getting IV pain control per protocol.    Mechanical DVT prophylaxis.    Plan for operating room tomorrow for bilateral distal radius ORIF. This will likely be performed by myself or one of my partners depending on availability, we'll determine after further intake.

## 2017-11-28 NOTE — CONSULT NOTE ADULT - ASSESSMENT
32 yr old M w/ no significant past medical history with a right comminuted intra-articular distal radius and ulna fracture  and left intra-articular distal radius fracture with metaphyseal extension  going for b/l ORIF
See above

## 2017-11-28 NOTE — H&P ADULT - NSHPPHYSICALEXAM_GEN_ALL_CORE
NAD, AOx3, comfortable  B/l wrists in sugar tong splints  Motor: +AIN/MN/UN/RN/PIN b/l, weaker effort on R 2/2 pain  SILT b/l  WWP, CR <2s b/l

## 2017-11-29 ENCOUNTER — TRANSCRIPTION ENCOUNTER (OUTPATIENT)
Age: 32
End: 2017-11-29

## 2017-11-29 VITALS — SYSTOLIC BLOOD PRESSURE: 149 MMHG | DIASTOLIC BLOOD PRESSURE: 77 MMHG

## 2017-11-29 PROCEDURE — 74177 CT ABD & PELVIS W/CONTRAST: CPT

## 2017-11-29 PROCEDURE — 97161 PT EVAL LOW COMPLEX 20 MIN: CPT

## 2017-11-29 PROCEDURE — 73564 X-RAY EXAM KNEE 4 OR MORE: CPT

## 2017-11-29 PROCEDURE — 97535 SELF CARE MNGMENT TRAINING: CPT

## 2017-11-29 PROCEDURE — 73552 X-RAY EXAM OF FEMUR 2/>: CPT

## 2017-11-29 PROCEDURE — 85730 THROMBOPLASTIN TIME PARTIAL: CPT

## 2017-11-29 PROCEDURE — 76000 FLUOROSCOPY <1 HR PHYS/QHP: CPT

## 2017-11-29 PROCEDURE — 73100 X-RAY EXAM OF WRIST: CPT

## 2017-11-29 PROCEDURE — 73110 X-RAY EXAM OF WRIST: CPT

## 2017-11-29 PROCEDURE — 85610 PROTHROMBIN TIME: CPT

## 2017-11-29 PROCEDURE — 70450 CT HEAD/BRAIN W/O DYE: CPT

## 2017-11-29 PROCEDURE — 93005 ELECTROCARDIOGRAM TRACING: CPT

## 2017-11-29 PROCEDURE — 71260 CT THORAX DX C+: CPT

## 2017-11-29 PROCEDURE — 86901 BLOOD TYPING SEROLOGIC RH(D): CPT

## 2017-11-29 PROCEDURE — 36415 COLL VENOUS BLD VENIPUNCTURE: CPT

## 2017-11-29 PROCEDURE — 71010: CPT

## 2017-11-29 PROCEDURE — 96375 TX/PRO/DX INJ NEW DRUG ADDON: CPT | Mod: XU

## 2017-11-29 PROCEDURE — 81003 URINALYSIS AUTO W/O SCOPE: CPT

## 2017-11-29 PROCEDURE — C1713: CPT

## 2017-11-29 PROCEDURE — 80053 COMPREHEN METABOLIC PANEL: CPT

## 2017-11-29 PROCEDURE — 73590 X-RAY EXAM OF LOWER LEG: CPT

## 2017-11-29 PROCEDURE — 86900 BLOOD TYPING SEROLOGIC ABO: CPT

## 2017-11-29 PROCEDURE — 72131 CT LUMBAR SPINE W/O DYE: CPT

## 2017-11-29 PROCEDURE — 99232 SBSQ HOSP IP/OBS MODERATE 35: CPT

## 2017-11-29 PROCEDURE — 72170 X-RAY EXAM OF PELVIS: CPT

## 2017-11-29 PROCEDURE — 73090 X-RAY EXAM OF FOREARM: CPT

## 2017-11-29 PROCEDURE — 82803 BLOOD GASES ANY COMBINATION: CPT

## 2017-11-29 PROCEDURE — 86850 RBC ANTIBODY SCREEN: CPT

## 2017-11-29 PROCEDURE — 85025 COMPLETE CBC W/AUTO DIFF WBC: CPT

## 2017-11-29 PROCEDURE — 72128 CT CHEST SPINE W/O DYE: CPT

## 2017-11-29 PROCEDURE — 80048 BASIC METABOLIC PNL TOTAL CA: CPT

## 2017-11-29 PROCEDURE — 96376 TX/PRO/DX INJ SAME DRUG ADON: CPT

## 2017-11-29 PROCEDURE — 96374 THER/PROPH/DIAG INJ IV PUSH: CPT | Mod: XU

## 2017-11-29 PROCEDURE — 72125 CT NECK SPINE W/O DYE: CPT

## 2017-11-29 PROCEDURE — 73080 X-RAY EXAM OF ELBOW: CPT

## 2017-11-29 PROCEDURE — 99285 EMERGENCY DEPT VISIT HI MDM: CPT | Mod: 25

## 2017-11-29 RX ORDER — DOCUSATE SODIUM 100 MG
1 CAPSULE ORAL
Qty: 0 | Refills: 0 | COMMUNITY
Start: 2017-11-29

## 2017-11-29 RX ORDER — OXYCODONE HYDROCHLORIDE 5 MG/1
1 TABLET ORAL
Qty: 0 | Refills: 0 | COMMUNITY
Start: 2017-11-29

## 2017-11-29 RX ADMIN — OXYCODONE HYDROCHLORIDE 10 MILLIGRAM(S): 5 TABLET ORAL at 10:00

## 2017-11-29 RX ADMIN — Medication 100 MILLIGRAM(S): at 09:46

## 2017-11-29 RX ADMIN — OXYCODONE HYDROCHLORIDE 10 MILLIGRAM(S): 5 TABLET ORAL at 06:45

## 2017-11-29 RX ADMIN — OXYCODONE HYDROCHLORIDE 10 MILLIGRAM(S): 5 TABLET ORAL at 11:00

## 2017-11-29 RX ADMIN — Medication 100 MILLIGRAM(S): at 00:54

## 2017-11-29 RX ADMIN — OXYCODONE HYDROCHLORIDE 10 MILLIGRAM(S): 5 TABLET ORAL at 14:47

## 2017-11-29 RX ADMIN — OXYCODONE HYDROCHLORIDE 10 MILLIGRAM(S): 5 TABLET ORAL at 15:47

## 2017-11-29 RX ADMIN — Medication 100 MILLIGRAM(S): at 14:47

## 2017-11-29 RX ADMIN — OXYCODONE HYDROCHLORIDE 10 MILLIGRAM(S): 5 TABLET ORAL at 01:40

## 2017-11-29 RX ADMIN — Medication 100 MILLIGRAM(S): at 05:52

## 2017-11-29 RX ADMIN — OXYCODONE HYDROCHLORIDE 10 MILLIGRAM(S): 5 TABLET ORAL at 05:53

## 2017-11-29 RX ADMIN — OXYCODONE HYDROCHLORIDE 10 MILLIGRAM(S): 5 TABLET ORAL at 00:54

## 2017-11-29 NOTE — OCCUPATIONAL THERAPY INITIAL EVALUATION ADULT - GENERAL OBSERVATIONS, REHAB EVAL
Right hand dominant. Chart reviewed, patient received seated at edge of bed, NAD, +BUE splint/ACE wrap, +heplock.

## 2017-11-29 NOTE — PROGRESS NOTE ADULT - SUBJECTIVE AND OBJECTIVE BOX
POST OPERATIVE DAY #: 1  STATUS POST: B/L DISTAL RADIUS ORIF                       SUBJECTIVE: Patient seen and examined. Pt. c/o mild numbness in right UE. Pt. states pain is not as bad as it was before.     Pain:  well controlled      OBJECTIVE:     Vital Signs Last 24 Hrs  T(C): 36.6 (2017 09:05), Max: 37.3 (2017 17:25)  T(F): 97.9 (2017 09:05), Max: 99.2 (2017 17:25)  HR: 90 (2017 09:05) (72 - 104)  BP: 131/82 (2017 09:05) (131/82 - 176/70)  BP(mean): 101 (2017 19:25) (100 - 111)  RR: 16 (2017 09:05) (14 - 32)  SpO2: 95% (2017 09:05) (94% - 100%)    Affected extremity: b/l ues         Dressing: clean/dry/intact b/l splints         Sensation: intact to light touch to patient's baseline         Motor exam:   5/5 b/l ues         warm, well-perfused; capillary refill < 3 seconds              I&O's Detail    2017 07:01  -  2017 07:00  --------------------------------------------------------  IN:    lactated ringers.: 640 mL  Total IN: 640 mL    OUT:    Voided: 500 mL  Total OUT: 500 mL    Total NET: 140 mL      2017 07:01  -  2017 12:01  --------------------------------------------------------  IN:    Oral Fluid: 420 mL  Total IN: 420 mL    OUT:    Voided: 500 mL  Total OUT: 500 mL    Total NET: -80 mL          LABS:                        14.0   11.5  )-----------( 248      ( 2017 06:49 )             41.2     11-    136  |  94<L>  |  17  ----------------------------<  126<H>  3.7   |  25  |  0.80    Ca    9.2      2017 06:49    TPro  7.9  /  Alb  4.4  /  TBili  0.2  /  DBili  x   /  AST  18  /  ALT  36  /  AlkPhos  95      PT/INR - ( 2017 15:41 )   PT: 10.6 sec;   INR: 0.96          PTT - ( 2017 15:41 )  PTT:32.6 sec  Urinalysis Basic - ( 2017 23:23 )    Color: Yellow / Appearance: Clear / S.025 / pH: x  Gluc: x / Ketone: NEGATIVE  / Bili: Negative / Urobili: 0.2 E.U./dL   Blood: x / Protein: NEGATIVE mg/dL / Nitrite: NEGATIVE   Leuk Esterase: NEGATIVE / RBC: x / WBC x   Sq Epi: x / Non Sq Epi: x / Bacteria: x        MEDICATIONS:    acetaminophen   Tablet 650 milliGRAM(s) Oral every 6 hours PRN  HYDROmorphone  Injectable 0.5 milliGRAM(s) IV Push every 4 hours PRN  HYDROmorphone  Injectable 0.5 milliGRAM(s) IV Push every 10 minutes PRN  metoclopramide Injectable 10 milliGRAM(s) IV Push every 6 hours PRN  morphine  - Injectable 2 milliGRAM(s) IV Push every 4 hours PRN  oxyCODONE    IR 10 milliGRAM(s) Oral every 4 hours PRN  oxyCODONE    IR 5 milliGRAM(s) Oral every 4 hours PRN          ASSESSMENT AND PLAN: 31yo m s/p b/l distal radius ORIF    1. Analgesic pain control  2. DVT prophylaxis:     SCDs         3. Weight Bearing Status:      NWB right ue         Partial Weight Bearing left ue  4. Disposition: Home today pending pt clearance

## 2017-11-29 NOTE — OCCUPATIONAL THERAPY INITIAL EVALUATION ADULT - MD ORDER
32-year-old adult male presented to the [facility] emergency room after falling from a football scaffolding. Patient reports he struck bilateral outstretched hands and protected his face, landed on his chest, sustaining an extension injury to his thoracic spine. He complains of pain in the bilateral wrists and thoracic spine but denies pain otherwise.

## 2017-11-29 NOTE — DISCHARGE NOTE ADULT - CARE PLAN
Principal Discharge DX:	Distal radius fracture, left  Goal:	Improvement in pain and ambulation after surgery  Instructions for follow-up, activity and diet:	No strenuous activity, heavy lifting, driving or returning to work until cleared by MD.  You may sponge bath.  Keep splints clean and dry. Do not get splints wet.   Try to have regular bowel movements, take stool softener or laxative if necessary.  May take Pepcid or Zantac for upset stomach.  May take Aleve or Naproxen instead of Meloxicam.  Swelling may travel all the way down to fingers, this is normal and will subside in a few weeks.  Call to schedule an appt with Dr. Casas for follow up on 12/4 or 12/6, if you have staples or sutures they will be removed in office.  Contact your doctor if you experience: fever greater than 101.5, chills, chest pain, difficulty breathing, redness or excessive drainage around the incision, other concerns.  Secondary Diagnosis:	Distal radius fracture, right Principal Discharge DX:	Distal radius fracture, left  Goal:	Improvement in pain and ambulation after surgery  Instructions for follow-up, activity and diet:	LEFT UE - PARTIAL WEIGHT BEARING, RIGHT UE- NON WEIGHT BEARING No strenuous activity, heavy lifting, driving or returning to work until cleared by MD.  You may sponge bath.  Keep splints clean and dry. Do not get splints wet.   Try to have regular bowel movements, take stool softener or laxative if necessary.  May take Pepcid or Zantac for upset stomach.  May take Aleve or Naproxen.  Swelling may travel all the way down to fingers, this is normal and will subside in a few weeks.  Call to schedule an appt with Dr. Casas for follow up on 12/4 or 12/6, if you have staples or sutures they will be removed in office.  Contact your doctor if you experience: fever greater than 101.5, chills, chest pain, difficulty breathing, redness or excessive drainage around the incision, other concerns.  Secondary Diagnosis:	Distal radius fracture, right

## 2017-11-29 NOTE — DISCHARGE NOTE ADULT - MEDICATION SUMMARY - MEDICATIONS TO TAKE
I will START or STAY ON the medications listed below when I get home from the hospital:    Percocet 5/325 oral tablet  -- 1-2 tab(s) by mouth every 4 to 6 hours, As Needed -for moderate pain MDD:8   -- Caution federal law prohibits the transfer of this drug to any person other  than the person for whom it was prescribed.  May cause drowsiness.  Alcohol may intensify this effect.  Use care when operating dangerous machinery.  This prescription cannot be refilled.  This product contains acetaminophen.  Do not use  with any other product containing acetaminophen to prevent possible liver damage.  Using more of this medication than prescribed may cause serious breathing problems.    -- Indication: For Pain med    docusate sodium 100 mg oral capsule  -- 1 cap(s) by mouth 3 times a day  -- Indication: For laxative

## 2017-11-29 NOTE — DISCHARGE NOTE ADULT - PLAN OF CARE
Improvement in pain and ambulation after surgery No strenuous activity, heavy lifting, driving or returning to work until cleared by MD.  You may sponge bath.  Keep splints clean and dry. Do not get splints wet.   Try to have regular bowel movements, take stool softener or laxative if necessary.  May take Pepcid or Zantac for upset stomach.  May take Aleve or Naproxen instead of Meloxicam.  Swelling may travel all the way down to fingers, this is normal and will subside in a few weeks.  Call to schedule an appt with Dr. Casas for follow up on 12/4 or 12/6, if you have staples or sutures they will be removed in office.  Contact your doctor if you experience: fever greater than 101.5, chills, chest pain, difficulty breathing, redness or excessive drainage around the incision, other concerns. LEFT UE - PARTIAL WEIGHT BEARING, RIGHT UE- NON WEIGHT BEARING No strenuous activity, heavy lifting, driving or returning to work until cleared by MD.  You may sponge bath.  Keep splints clean and dry. Do not get splints wet.   Try to have regular bowel movements, take stool softener or laxative if necessary.  May take Pepcid or Zantac for upset stomach.  May take Aleve or Naproxen.  Swelling may travel all the way down to fingers, this is normal and will subside in a few weeks.  Call to schedule an appt with Dr. Casas for follow up on 12/4 or 12/6, if you have staples or sutures they will be removed in office.  Contact your doctor if you experience: fever greater than 101.5, chills, chest pain, difficulty breathing, redness or excessive drainage around the incision, other concerns.

## 2017-11-29 NOTE — OCCUPATIONAL THERAPY INITIAL EVALUATION ADULT - MANUAL MUSCLE TESTING RESULTS, REHAB EVAL
bilateral shoulders 3/5. Right elbow/wrist not tested. Right digits 3-/5. Left elbow 3/5, left digits 3-/5.

## 2017-11-29 NOTE — DISCHARGE NOTE ADULT - PATIENT PORTAL LINK FT
“You can access the [facility] Patient Portal, offered by [facility], by registering with the following website: [email or web address]”

## 2017-11-29 NOTE — DISCHARGE NOTE ADULT - HOSPITAL COURSE
Admitted  Surgery  [facility] Antibiotics  Pain control  DVT prophylaxis  OOB/Physical Therapy

## 2017-11-29 NOTE — PROGRESS NOTE ADULT - ASSESSMENT
32 yr old M w/ no significant past medical history with a right comminuted intra-articular distal radius and ulna fracture  and left intra-articular distal radius fracture with metaphyseal extension  going for b/l ORIF
A/P:  32y Male s/p bilateral distal radius ORIF on 11/29/17  - NWB RUE, PFWB LUE  - DVT ppx: OOB, SCDs  - PT  - Analgesia  - Dispo home today  - Antibiotics: perioperative x24h

## 2017-11-29 NOTE — PROGRESS NOTE ADULT - SUBJECTIVE AND OBJECTIVE BOX
Patient is a 32y old  Male who presents with a chief complaint of b/l wrist pain (2017 11:42)      INTERVAL HPI/OVERNIGHT EVENTS: Patient went for B/l ORIF    Review of Systems: 12 point review of systems otherwise negative  ( - )fevers/chills  ( - ) dyspnea  ( - ) cough  ( - ) chest pain  ( - ) palpatations  ( - ) dizziness/lightheadedness  ( - ) nausea/vomiting  ( - ) abd pain  ( - ) diarrhea  ( - ) melena  ( - ) hematochezia  ( - ) dysuria  ( - ) hematuria  ( - ) leg swelling  ( -) calf tenderness  ( - ) motor weakness  ( - ) extremity numbness  ( - ) back pain  ( + ) tolerating POs  ( + ) BM    MEDICATIONS  (STANDING):  docusate sodium 100 milliGRAM(s) Oral three times a day  influenza   Vaccine 0.5 milliLiter(s) IntraMuscular once  lactated ringers. 1000 milliLiter(s) (80 mL/Hr) IV Continuous <Continuous>    MEDICATIONS  (PRN):  acetaminophen   Tablet 650 milliGRAM(s) Oral every 6 hours PRN For Temp greater than 38 C (100.4 F)  HYDROmorphone  Injectable 0.5 milliGRAM(s) IV Push every 4 hours PRN Breakthrough pain  HYDROmorphone  Injectable 0.5 milliGRAM(s) IV Push every 10 minutes PRN breakthrough pain  metoclopramide Injectable 10 milliGRAM(s) IV Push every 6 hours PRN Nausea and/or Vomiting  morphine  - Injectable 2 milliGRAM(s) IV Push every 4 hours PRN Severe Pain (7 - 10)  oxyCODONE    IR 10 milliGRAM(s) Oral every 4 hours PRN Moderate Pain  oxyCODONE    IR 5 milliGRAM(s) Oral every 4 hours PRN Mild Pain      Allergies    No Known Allergies    Intolerances          Vital Signs Last 24 Hrs  T(C): 36.8 (2017 15:33), Max: 37.3 (2017 17:25)  T(F): 98.3 (2017 15:33), Max: 99.2 (2017 17:25)  HR: 81 (2017 15:33) (72 - 104)  BP: 171/74 (2017 15:33) (131/82 - 176/70)  BP(mean): 101 (2017 19:25) (100 - 111)  RR: 18 (2017 15:33) (14 - 32)  SpO2: 98% (2017 15:33) (94% - 100%)  CAPILLARY BLOOD GLUCOSE           @ 07:  -   @ 07:00  --------------------------------------------------------  IN: 640 mL / OUT: 500 mL / NET: 140 mL     @ 07:01  -   @ 15:34  --------------------------------------------------------  IN: 420 mL / OUT: 500 mL / NET: -80 mL        Physical Exam:    Daily     Daily   General:  Well appearing, NAD, not cachetic  HEENT:  Nonicteric, PERRLA  CV:  RRR, no murmur, no JVD  Lungs:  CTA B/L, no wheezes, rales, rhonchi  Abdomen:  Soft, non-tender, no distended, positive BS, no hepatosplenomegaly  Skin:  Warm and dry, no rashes  :  No mann  Neuro:  AAOx3, non-focal, CN II-XII grossly intact  No Restraints    LABS:                        14.0   11.5  )-----------( 248      ( 2017 06:49 )             41.2         136  |  94<L>  |  17  ----------------------------<  126<H>  3.7   |  25  |  0.80    Ca    9.2      2017 06:49    TPro  7.9  /  Alb  4.4  /  TBili  0.2  /  DBili  x   /  AST  18  /  ALT  36  /  AlkPhos  95  11-27    PT/INR - ( 2017 15:41 )   PT: 10.6 sec;   INR: 0.96          PTT - ( 2017 15:41 )  PTT:32.6 sec  Urinalysis Basic - ( 2017 23:23 )    Color: Yellow / Appearance: Clear / S.025 / pH: x  Gluc: x / Ketone: NEGATIVE  / Bili: Negative / Urobili: 0.2 E.U./dL   Blood: x / Protein: NEGATIVE mg/dL / Nitrite: NEGATIVE   Leuk Esterase: NEGATIVE / RBC: x / WBC x   Sq Epi: x / Non Sq Epi: x / Bacteria: x          RADIOLOGY & ADDITIONAL TESTS:    ---------------------------------------------------------------------------  I personally reviewed: [  ]EKG   [  ]CXR    [  ] CT    [  ]Other  ---------------------------------------------------------------------------  PLEASE CHECK WHEN PRESENT:     [  ]Heart Failure     [  ] Acute     [  ] Acute on Chronic     [  ] Chronic  -------------------------------------------------------------------     [  ]Diastolic [HFpEF]     [  ]Systolic [HFrEF]     [  ]Combined [HFpEF & HFrEF]     [  ]Other:  -------------------------------------------------------------------  [  ]SATYA     [  ]ATN     [  ]Reneal Medullary Necrosis     [  ]Renal Cortical Necrosis     [  ]Other Pathological Lesions:    [  ]CKD 1  [  ]CKD 2  [  ]CKD 3  [  ]CKD 4  [  ]CKD 5  [  ]Other  -------------------------------------------------------------------  [  ]Other/Unspecified:    --------------------------------------------------------------------    Abdominal Nutritional Status  [  ]Malnutrition: See Nutrition Note  [  ]Cachexia  [  ]Other:   [  ]Supplement Ordered:  [  ]Morbid Obesity (BMI >=40]

## 2017-11-29 NOTE — OCCUPATIONAL THERAPY INITIAL EVALUATION ADULT - RANGE OF MOTION EXAMINATION, UPPER EXTREMITY
Bilateral shoulder AROM WFL. Left elbow AROM WFL. Left wrist not tested. Left digits impaired AROM 2/2 splint. Right elbow/wrist not tested. Impaired right digit AROM 2/2 edema, pain.

## 2017-11-29 NOTE — PROGRESS NOTE ADULT - SUBJECTIVE AND OBJECTIVE BOX
Orthopaedic Surgery Progress Note    S: Pain well-controlled. Denies CP/SOB/N/V    O:  AFVSS    MEDICATIONS  (STANDING):  docusate sodium 100 milliGRAM(s) Oral three times a day  lactated ringers. 1000 milliLiter(s) (80 mL/Hr) IV Continuous <Continuous>    MEDICATIONS  (PRN):  acetaminophen   Tablet 650 milliGRAM(s) Oral every 6 hours PRN For Temp greater than 38 C (100.4 F)  HYDROmorphone  Injectable 0.5 milliGRAM(s) IV Push every 4 hours PRN Breakthrough pain  HYDROmorphone  Injectable 0.5 milliGRAM(s) IV Push every 10 minutes PRN breakthrough pain  metoclopramide Injectable 10 milliGRAM(s) IV Push every 6 hours PRN Nausea and/or Vomiting  morphine  - Injectable 2 milliGRAM(s) IV Push every 4 hours PRN Severe Pain (7 - 10)  oxyCODONE    IR 10 milliGRAM(s) Oral every 4 hours PRN Moderate Pain  oxyCODONE    IR 5 milliGRAM(s) Oral every 4 hours PRN Mild Pain                            14.0   11.5  )-----------( 248      ( 28 Nov 2017 06:49 )             41.2       11-28    136  |  94<L>  |  17  ----------------------------<  126<H>  3.7   |  25  |  0.80    Ca    9.2      28 Nov 2017 06:49            EXAM:  Gen: NAD, Alert    RUE: Dressing C/D/I, Fires FPL/EIP/JEREMI, SILT m/u/r, fingers wwp. R 4th finger has inability to flex at DIP joint. Per patient this is an old injury and has not changed since his surgery.    LUE: Dressing C/D/I, Fires FPL/EIP/JEREMI, SILT m/u/r, fingers wwp

## 2017-12-07 DIAGNOSIS — M54.2 CERVICALGIA: ICD-10-CM

## 2017-12-07 DIAGNOSIS — S52.571A OTHER INTRAARTICULAR FRACTURE OF LOWER END OF RIGHT RADIUS, INITIAL ENCOUNTER FOR CLOSED FRACTURE: ICD-10-CM

## 2017-12-07 DIAGNOSIS — M54.6 PAIN IN THORACIC SPINE: ICD-10-CM
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
## 2017-12-07 DIAGNOSIS — S52.572A OTHER INTRAARTICULAR FRACTURE OF LOWER END OF LEFT RADIUS, INITIAL ENCOUNTER FOR CLOSED FRACTURE: ICD-10-CM
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
## 2017-12-07 DIAGNOSIS — S52.691A OTHER FRACTURE OF LOWER END OF RIGHT ULNA, INITIAL ENCOUNTER FOR CLOSED FRACTURE: ICD-10-CM

## 2017-12-07 PROBLEM — Z00.00 ENCOUNTER FOR PREVENTIVE HEALTH EXAMINATION: Status: ACTIVE | Noted: 2017-12-07

## 2017-12-08 ENCOUNTER — APPOINTMENT (OUTPATIENT)
Dept: ORTHOPEDIC SURGERY | Facility: CLINIC | Age: 32
End: 2017-12-08
Payer: OTHER MISCELLANEOUS

## 2017-12-08 VITALS — WEIGHT: 125 LBS | RESPIRATION RATE: 16 BRPM | BODY MASS INDEX: 19.17 KG/M2 | HEIGHT: 67.72 IN

## 2017-12-08 PROCEDURE — 99024 POSTOP FOLLOW-UP VISIT: CPT

## 2017-12-17 ENCOUNTER — FORM ENCOUNTER (OUTPATIENT)
Age: 32
End: 2017-12-17

## 2017-12-18 ENCOUNTER — APPOINTMENT (OUTPATIENT)
Dept: RADIOLOGY | Facility: CLINIC | Age: 32
End: 2017-12-18

## 2017-12-18 ENCOUNTER — APPOINTMENT (OUTPATIENT)
Dept: ORTHOPEDIC SURGERY | Facility: CLINIC | Age: 32
End: 2017-12-18
Payer: SELF-PAY

## 2017-12-18 ENCOUNTER — OUTPATIENT (OUTPATIENT)
Dept: OUTPATIENT SERVICES | Facility: HOSPITAL | Age: 32
LOS: 1 days | End: 2017-12-18
Payer: OTHER MISCELLANEOUS

## 2017-12-18 VITALS — HEIGHT: 67 IN | WEIGHT: 125 LBS | RESPIRATION RATE: 16 BRPM | BODY MASS INDEX: 19.62 KG/M2

## 2017-12-18 DIAGNOSIS — G47.9 SLEEP DISORDER, UNSPECIFIED: ICD-10-CM

## 2017-12-18 PROCEDURE — 73110 X-RAY EXAM OF WRIST: CPT | Mod: 26,50

## 2017-12-18 PROCEDURE — 99024 POSTOP FOLLOW-UP VISIT: CPT

## 2017-12-18 RX ORDER — OXYCODONE AND ACETAMINOPHEN 5; 325 MG/1; MG/1
5-325 TABLET ORAL
Qty: 40 | Refills: 0 | Status: DISCONTINUED | OUTPATIENT
Start: 2017-12-08 | End: 2017-12-18

## 2018-01-16 ENCOUNTER — FORM ENCOUNTER (OUTPATIENT)
Age: 33
End: 2018-01-16

## 2018-01-17 ENCOUNTER — APPOINTMENT (OUTPATIENT)
Dept: ORTHOPEDIC SURGERY | Facility: CLINIC | Age: 33
End: 2018-01-17
Payer: SELF-PAY

## 2018-01-17 ENCOUNTER — APPOINTMENT (OUTPATIENT)
Dept: RADIOLOGY | Facility: CLINIC | Age: 33
End: 2018-01-17
Payer: OTHER MISCELLANEOUS

## 2018-01-17 ENCOUNTER — OUTPATIENT (OUTPATIENT)
Dept: OUTPATIENT SERVICES | Facility: HOSPITAL | Age: 33
LOS: 1 days | End: 2018-01-17

## 2018-01-17 VITALS — BODY MASS INDEX: 19.62 KG/M2 | RESPIRATION RATE: 16 BRPM | HEIGHT: 67 IN | WEIGHT: 125 LBS

## 2018-01-17 PROCEDURE — 73110 X-RAY EXAM OF WRIST: CPT | Mod: 26,50

## 2018-01-17 PROCEDURE — 99024 POSTOP FOLLOW-UP VISIT: CPT

## 2018-02-15 ENCOUNTER — FORM ENCOUNTER (OUTPATIENT)
Age: 33
End: 2018-02-15

## 2018-02-16 ENCOUNTER — OUTPATIENT (OUTPATIENT)
Dept: OUTPATIENT SERVICES | Facility: HOSPITAL | Age: 33
LOS: 1 days | End: 2018-02-16
Payer: SELF-PAY

## 2018-02-16 ENCOUNTER — APPOINTMENT (OUTPATIENT)
Dept: ORTHOPEDIC SURGERY | Facility: CLINIC | Age: 33
End: 2018-02-16
Payer: OTHER MISCELLANEOUS

## 2018-02-16 ENCOUNTER — APPOINTMENT (OUTPATIENT)
Dept: RADIOLOGY | Facility: CLINIC | Age: 33
End: 2018-02-16

## 2018-02-16 PROCEDURE — 99024 POSTOP FOLLOW-UP VISIT: CPT

## 2018-02-16 PROCEDURE — 73110 X-RAY EXAM OF WRIST: CPT

## 2018-02-16 PROCEDURE — 73110 X-RAY EXAM OF WRIST: CPT | Mod: 26,RT

## 2018-03-16 ENCOUNTER — APPOINTMENT (OUTPATIENT)
Dept: ORTHOPEDIC SURGERY | Facility: CLINIC | Age: 33
End: 2018-03-16
Payer: OTHER MISCELLANEOUS

## 2018-03-16 VITALS — HEIGHT: 67 IN | WEIGHT: 125 LBS | RESPIRATION RATE: 16 BRPM | BODY MASS INDEX: 19.62 KG/M2

## 2018-03-16 PROCEDURE — 99213 OFFICE O/P EST LOW 20 MIN: CPT

## 2018-04-26 ENCOUNTER — FORM ENCOUNTER (OUTPATIENT)
Age: 33
End: 2018-04-26

## 2018-04-27 ENCOUNTER — APPOINTMENT (OUTPATIENT)
Dept: RADIOLOGY | Facility: CLINIC | Age: 33
End: 2018-04-27

## 2018-04-27 ENCOUNTER — APPOINTMENT (OUTPATIENT)
Dept: ORTHOPEDIC SURGERY | Facility: CLINIC | Age: 33
End: 2018-04-27
Payer: OTHER MISCELLANEOUS

## 2018-04-27 ENCOUNTER — OUTPATIENT (OUTPATIENT)
Dept: OUTPATIENT SERVICES | Facility: HOSPITAL | Age: 33
LOS: 1 days | End: 2018-04-27
Payer: OTHER MISCELLANEOUS

## 2018-04-27 VITALS — RESPIRATION RATE: 16 BRPM | BODY MASS INDEX: 19.62 KG/M2 | HEIGHT: 67 IN | WEIGHT: 125 LBS

## 2018-04-27 PROCEDURE — 73110 X-RAY EXAM OF WRIST: CPT | Mod: 26,RT

## 2018-04-27 PROCEDURE — 99213 OFFICE O/P EST LOW 20 MIN: CPT

## 2018-05-18 ENCOUNTER — APPOINTMENT (OUTPATIENT)
Dept: PHYSICAL MEDICINE AND REHAB | Facility: CLINIC | Age: 33
End: 2018-05-18

## 2018-07-27 ENCOUNTER — APPOINTMENT (OUTPATIENT)
Dept: ORTHOPEDIC SURGERY | Facility: CLINIC | Age: 33
End: 2018-07-27
Payer: OTHER MISCELLANEOUS

## 2018-07-27 VITALS — WEIGHT: 125 LBS | BODY MASS INDEX: 19.62 KG/M2 | HEIGHT: 67 IN | RESPIRATION RATE: 16 BRPM

## 2018-07-27 PROCEDURE — 99214 OFFICE O/P EST MOD 30 MIN: CPT

## 2018-07-27 RX ORDER — DIPHENHYDRAMINE HCL 25 MG/1
25 CAPSULE ORAL
Qty: 20 | Refills: 0 | Status: DISCONTINUED | OUTPATIENT
Start: 2017-12-18 | End: 2018-07-27

## 2018-07-27 RX ORDER — TRAMADOL HYDROCHLORIDE 50 MG/1
50 TABLET, COATED ORAL
Qty: 40 | Refills: 0 | Status: DISCONTINUED | COMMUNITY
Start: 2018-02-16 | End: 2018-07-27

## 2018-09-12 ENCOUNTER — FORM ENCOUNTER (OUTPATIENT)
Age: 33
End: 2018-09-12

## 2018-09-13 ENCOUNTER — APPOINTMENT (OUTPATIENT)
Dept: ORTHOPEDIC SURGERY | Facility: CLINIC | Age: 33
End: 2018-09-13
Payer: OTHER MISCELLANEOUS

## 2018-09-13 ENCOUNTER — OUTPATIENT (OUTPATIENT)
Dept: OUTPATIENT SERVICES | Facility: HOSPITAL | Age: 33
LOS: 1 days | End: 2018-09-13
Payer: OTHER MISCELLANEOUS

## 2018-09-13 ENCOUNTER — APPOINTMENT (OUTPATIENT)
Dept: RADIOLOGY | Facility: CLINIC | Age: 33
End: 2018-09-13
Payer: OTHER MISCELLANEOUS

## 2018-09-13 DIAGNOSIS — S52.501D UNSPECIFIED FRACTURE OF THE LOWER END OF RIGHT RADIUS, SUBSEQUENT ENCOUNTER FOR CLOSED FRACTURE WITH ROUTINE HEALING: ICD-10-CM

## 2018-09-13 DIAGNOSIS — Z87.81 OTHER SPECIFIED POSTPROCEDURAL STATES: ICD-10-CM

## 2018-09-13 DIAGNOSIS — Z98.890 OTHER SPECIFIED POSTPROCEDURAL STATES: ICD-10-CM

## 2018-09-13 DIAGNOSIS — S52.601D UNSPECIFIED FRACTURE OF THE LOWER END OF RIGHT RADIUS, SUBSEQUENT ENCOUNTER FOR CLOSED FRACTURE WITH ROUTINE HEALING: ICD-10-CM

## 2018-09-13 DIAGNOSIS — S52.572D OTHER INTRAARTICULAR FRACTURE OF LOWER END OF LEFT RADIUS, SUBSEQUENT ENCOUNTER FOR CLOSED FRACTURE WITH ROUTINE HEALING: ICD-10-CM

## 2018-09-13 DIAGNOSIS — S66.811D STRAIN OF OTHER SPECIFIED MUSCLES, FASCIA AND TENDONS AT WRIST AND HAND LEVEL, RIGHT HAND, SUBSEQUENT ENCOUNTER: ICD-10-CM

## 2018-09-13 PROCEDURE — 73110 X-RAY EXAM OF WRIST: CPT | Mod: 26,50

## 2018-09-13 PROCEDURE — 73110 X-RAY EXAM OF WRIST: CPT

## 2018-09-13 PROCEDURE — 99214 OFFICE O/P EST MOD 30 MIN: CPT

## 2019-02-13 ENCOUNTER — APPOINTMENT (OUTPATIENT)
Dept: ORTHOPEDIC SURGERY | Facility: CLINIC | Age: 34
End: 2019-02-13
Payer: OTHER MISCELLANEOUS

## 2019-02-13 VITALS — WEIGHT: 180.78 LBS | HEIGHT: 67 IN | BODY MASS INDEX: 28.37 KG/M2

## 2019-02-13 PROCEDURE — 99214 OFFICE O/P EST MOD 30 MIN: CPT | Mod: 57

## 2019-07-08 ENCOUNTER — APPOINTMENT (OUTPATIENT)
Dept: ORTHOPEDIC SURGERY | Facility: CLINIC | Age: 34
End: 2019-07-08
Payer: OTHER MISCELLANEOUS

## 2019-07-08 VITALS — WEIGHT: 180 LBS | BODY MASS INDEX: 28.25 KG/M2 | HEIGHT: 67 IN | RESPIRATION RATE: 16 BRPM

## 2019-07-08 DIAGNOSIS — T84.84XA PAIN DUE TO INTERNAL ORTHOPEDIC PROSTHETIC DEVICES, IMPLANTS AND GRAFTS, INITIAL ENCOUNTER: ICD-10-CM

## 2019-07-08 DIAGNOSIS — M77.8 OTHER ENTHESOPATHIES, NOT ELSEWHERE CLASSIFIED: ICD-10-CM

## 2019-07-08 PROCEDURE — 99214 OFFICE O/P EST MOD 30 MIN: CPT | Mod: 57

## 2019-07-09 NOTE — HISTORY OF PRESENT ILLNESS
[FreeTextEntry1] : Galindo who is a 35 y/o M returns s/p bilateral distal radius ORIF that was done on 11/28/2017. He reports that he still has pain and discomfort. Pt is also here to go over surgical planning.

## 2019-07-09 NOTE — PHYSICAL EXAM
[de-identified] : breathing and is in no acute distress.\par Psych: The patient is appropriately dressed and groomed, maintains good eye contact. Alert and oriented x 3. Normal attention/concentration\par Skin: The patient has no chronic skin lesions, rashes, or ulcers. There is no induration or erythema of uninvolved extremities. For skin exam of involved extremity refer to detailed musculoskeletal/extremity exam. \par Respiratory: The patient is in no apparent respiratory distress. They're taking full deep breaths with normal excursion, without use of accessory muscles or evidence of audible wheezes or stridor without the use of a stethoscope. \par Neurological: 5/5 motor strength and sensation intact throughout uninvolved upper and lower extremities, refer to detailed musculoskeletal exam regarding involved extremity.\par \par Left upper extremity\par Well-healed scar about the left volar wrist. \par Moderate to severe crepitus with range of motion of his digits overlying the volar wrist\par Pain limits  strength most noted on flexor to the index finger\par No point tenderness to palpation over the dorsum of the wrist. \par Near full flexion extension ulnar and radial deviation. \par Increased swelling over the volar aspect of the wrist. \par Flexor tendons are intact to all digits. \par Appearance: Skin is intact. There is no skin breakdown. There were no lesions. There is no erythema.\par Vascular: The patient has a warm and well-perfused foot. There is a palpable 2+ radial pulse There is brisk capillary refill to all 5 digits.\par Sensation intact to light touch in median ulnar and radial distributions \par Motors: Patient is able to fire AIN, PIN, ulnar nerves The patient has 5 out of 5 strength in all myotomes.\par Lymph: No evidence of venous stasis ulcers. No lymphedema. No pitting edema.  [de-identified] : 2.13.2019 Fluoroscopic evaluation of bilateral wrists including 3 views of each wrist show intact hardware. There is no further displacement of his fractures. His hardware is intact. There is no evidence of broken hardware.

## 2019-07-09 NOTE — REVIEW OF SYSTEMS
[Joint Pain] : joint pain [Joint Stiffness] : joint stiffness [FreeTextEntry9] : Review of Systems no feelings of weakness \par All other review of systems are negative except as noted. \par Constitutional: no chills, not feeling tired and no fever. \par Eyes: no discharge, no pain and no redness. \par ENT: no nasal discharge, no nosebleeds and no sore throat. \par Cardiovascular: no chest pain, no palpitations and the heart rate was not fast. \par Respiratory: no shortness of breath, no cough and no wheezing. \par Gastrointestinal: no abdominal pain, no diarrhea, no vomiting and no melena. \par Genitourinary: no pelvic pain, no dysuria, no abnormal urethral discharge and no frequency. \par Integumentary: no skin lesions and no change in a mole. \par Neurological: no dizziness, no fainting and no convulsions. \par Endocrine: no deepening of the voice and no hot flashes. \par Hematologic/Lymphatic: does not bleed easily, no swollen glands and no cervical lymphadenopathy.\par Musculoskeletal: [as per HPI, otherwise denies additional joint pain, effusions, stiffness.]\par \par Please refer to the attached intake form for additional history and details.

## 2019-07-09 NOTE — DISCUSSION/SUMMARY
[de-identified] : 07.08.2019 \par 34-year-old male well known to me that had bilateral distal radius fractures after a fall from height. He fell while on the job and sustained bilateral distal radius fractures that underwent open reduction internal fixation by myself. This was done back in November of 2017. The patient continues to have symptomatic hardware about his left distal radius with crepitus with range of motion of his digits consistent with irritation of the flexor tendons. We have been attempting to cooridnate his hardware removal for multiple months and have been unable to get authorization from his workers compensation program. He is interested in having his hardware out on bilateral wrists. We had another extensive discussion today regarding his pathology and staged removal of hardware. I do not believe it is in the best interest of the patient to operate on bilateral upper extremities to remove his hardware.I believe it is in his best interest to remove the hardware from his list wrist first, and then consideration an operation to his right wrist. In addition his right wrist is complicated by his previous extensor carpi ulnaris laceration prior to his fall. I believe that his right wrist would potentially benefit from removal of hardware but that he likely will also need tendon repair/reconstruction of his ECU\par \par I explained to the patient once again that any further delay in treatment regarding his hardware removal may lead to worsening tendinitis with potential propagation of a flexor tendon tear. The patient understood this and will be reaching out to us in the very near future to insure that his workers comp team understands the risks associated with a delay in care\par \par All risks, benefits and alternatives to the proposed surgical procedure, left wrist removal of hardware, tenolysis of the flexor tendons, possible flexor tendon repair, were discussed in great detail with the patient. Risks include but are not limited to pain, bleeding, infection, stiffness, flexor tendon rupture, chronic pain, medical complications (including DVT, PE, MI), and risks of anesthesia. The patient expressed understanding and all questions were answered. The patient has elected to proceed but booking of his surgery has been hindered by worker's compensation authorization. He is having a meeting with his  on July 25, 2019. I instructed the patient to call the office backline on that day so that someone from my clinical team can here with that issues are with moving forward with his surgery. \par \par 1.Instructed pt to call the office during his meeting with his  on 7.25.2019. Pt was given direct line to my team. \par 2. medical clearance pending\par 3. the patient was a advised that ongoing crepitus about his wrist flexor tendons as educated previously puts him at increased risk for rupture. \par 4. Surgery scheduling pending authorization\par  \par

## 2019-12-25 PROBLEM — Z98.890 STATUS POST OPEN REDUCTION WITH INTERNAL FIXATION OF FRACTURE: Status: ACTIVE | Noted: 2017-12-08

## 2021-08-30 NOTE — PRE-OP CHECKLIST - HAIR REMOVAL
hair removal not indicated Helical Rim Advancement Flap Text: The defect edges were debeveled with a #15 blade scalpel.  Given the location of the defect and the proximity to free margins (helical rim) a double helical rim advancement flap was deemed most appropriate.  Using a sterile surgical marker, the appropriate advancement flaps were drawn incorporating the defect and placing the expected incisions between the helical rim and antihelix where possible.  The area thus outlined was incised through and through with a #15 scalpel blade.  With a skin hook and iris scissors, the flaps were gently and sharply undermined and freed up.

## 2022-01-01 NOTE — ED ADULT NURSE NOTE - RESPIRATORY WDL
Breathing spontaneous and unlabored. Breath sounds clear and equal bilaterally with regular rhythm. Single liveborn, born in hospital, delivered without  delivery

## 2022-01-14 NOTE — H&P ADULT - HISTORY OF PRESENT ILLNESS
32-year-old adult male presented to the [facility] emergency room after falling from a football scaffolding. Patient reports he struck bilateral outstretched hands and protected his face, landed on his chest, sustaining an extension injury to his thoracic spine. He complains of pain in the bilateral wrists and thoracic spine but denies pain otherwise. He was taken to the emergency room where CT scan was performed of the head neck thoracic and lumbar spine as well as chest abdomen pelvis which were negative. Patient was immobilized in a cervical collar and orthopedic consult was placed.    At the time of evaluation by attending at [facility] patient complained of pain in the bilateral wrists which was severe. He reported a deformity and inability to bear weight in the right wrist. He denied any numbness or tingling to the fingers. Reported weakness associated with pain but no loss of motion. Denies any other symptoms. Pain is aggravated with any motion in relieved with immobilization. Treatment prior to our evaluation consisted only of IV pain control.    Patient also complained of midthoracic central spinal pain which is made worse with taking deep breaths. No other associated symptoms. No aggravating or alleviating factors. No prior treatment.    Patient denies any other pain at rest. He did report pain with palpation or motion of the cervical spine which is midline in the mid cervical region. Again denies any neurologic deficits to the bilateral upper bilateral lower extremities. He denies any change in vision, smell, taste. Denies any change in balance. Denies any loss of memory.    Transferred to [facility] for operative management.
last on day prior to presentation, unknown quantity and roughly every other day usage

## 2022-04-19 NOTE — ED ADULT NURSE NOTE - NS ED PATIENT SAFETY CONCERN
2. GOAL: In 3 weeks, pt will be able to amb 300 ft w/ least restrictive AD or no AD independently.
No

## 2022-06-25 NOTE — OCCUPATIONAL THERAPY INITIAL EVALUATION ADULT - HOME MANAGEMENT SKILLS, PREVIOUS LEVEL OF FUNCTION, OT EVAL
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        Patient Active Problem List   Diagnosis    Vitamin D deficiency    Hisotry of Vulvar cancer    Anemia of decreased vitamin B12 absorption    Anemia due to stage 5 chronic kidney disease treated with erythropoietin    Hypothyroidism    Anemia of chronic disease    Nausea & vomiting    Chronic diarrhea    S/P ileostomy    DARCI (acute kidney injury)    Severe dehydration due to chronic diarrhea/high output ileostomy    Electrolyte imbalance    Severe malnutrition    History of difficult venous access    Malnutrition    Acute hypoxemic respiratory failure    Status post reversal of ileostomy    Chronic diarrhea    Mild protein-energy malnutrition    Perineal rash, female    Perianal cellulitis    Pneumonia due to infectious organism    Right hydroureter secondary to obstructing calculi    Right-sided pyelonephritis    ESRD on dialysis MWF    Macrocytic anemia    Constipation    Bilateral LE neuropathy    Essential hypertension    Nephrolithiasis    Sepsis    Hypomagnesemia    Acute on chronic anemia    Vulvar cellulitis    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 female who  has a past medical history of Cellulitis of trunk, Chronic diarrhea (02/10/2020), Diabetes mellitus, type 2, End stage renal disease on dialysis (01/07/2018), Fatigue, Hypertension, Hypothyroidism (2/10/2020), Iron deficiency anemia due to chronic blood loss (1/7/2018), Pulmonary hypertension (3/20/2020), and Vulvar cancer (1/7/2019).  The patient presented to [facility] on 6/21/2022 with a primary complaint of Flank Pain (Right side flank pain onset one hour ago.  Pt on dialysis at [facility] on M,W,F)  .       For the full HPI please refer to the History & Physical from this admission.    Hospital Course     Admitted with flank pain and found to have pyelonephritis/right obstructive uropathy with ureteral stent in place.  Seen in conjunction with Infectious Disease and Urology.  She underwent ureteral stent exchange on 06/22.  Per culture data/Infectious Disease recommendations she will complete a course of Merrem for 14 days.  She has a midline in place and home antibiotics have been arranged.  She will follow-up with urology in 2-3 weeks for planning for definitive management of her stent/stones.  She feels well currently, essentially baseline.  She is eager to go home.  Advised her to return to the ED with any new, worsening, or recurrent symptoms.  Follow-up with her regular nephrologist and her primary care provider as well.    Physical exam     Physical Exam  Constitutional:       General: She is not in acute distress.  HENT:      Head: Normocephalic and atraumatic.      Mouth/Throat:      Mouth: Mucous membranes are moist.      Pharynx: Oropharynx is clear.   Eyes:      General: No scleral icterus.     Pupils: Pupils are equal, round, and reactive to light.   Cardiovascular:      Rate and Rhythm: Normal rate and regular rhythm.   Pulmonary:      Effort: Pulmonary effort is normal. No respiratory distress.   Abdominal:       General: There is no distension.   Skin:     General: Skin is warm and dry.   Neurological:      Mental Status: She is alert.   Psychiatric:         Mood and Affect: Mood normal.         Behavior: Behavior normal.           Discharge Medications        Medication List      START taking these medications    meropenem-0.9% sodium chloride 500 mg/50 mL Pgbk  Inject 50 mLs (500 mg total) into the vein every 12 (twelve) hours.  Start taking on: June 26, 2022        CHANGE how you take these medications    levothyroxine 88 MCG tablet  Commonly known as: SYNTHROID  TAKE 1 TABLET BEFORE BREAKFAST  What changed:   · how much to take  · how to take this  · when to take this     promethazine 25 MG tablet  Commonly known as: PHENERGAN  TAKE 1 TABLET(25 MG) BY MOUTH TWICE DAILY AS NEEDED FOR NAUSEA  What changed: See the new instructions.     rOPINIRole 0.5 MG tablet  Commonly known as: REQUIP  What changed: Another medication with the same name was removed. Continue taking this medication, and follow the directions you see here.        CONTINUE taking these medications    albuterol-ipratropium 2.5 mg-0.5 mg/3 mL nebulizer solution  Commonly known as: DUO-NEB  Take 3 mLs by nebulization every 6 (six) hours as needed for Wheezing or Shortness of Breath. Rescue     AURYXIA 210 mg iron Tab  Generic drug: ferric citrate     diltiaZEM 120 MG Cp24  Commonly known as: CARDIZEM CD  Take 1 capsule (120 mg total) by mouth once daily.     gabapentin 100 MG capsule  Commonly known as: NEURONTIN     hydrALAZINE 50 MG tablet  Commonly known as: APRESOLINE  Take 1 tablet (50 mg total) by mouth every 8 (eight) hours.     LORazepam 0.5 MG tablet  Commonly known as: ATIVAN  Take 1 tablet (0.5 mg total) by mouth every evening.     sertraline 50 MG tablet  Commonly known as: ZOLOFT  Take 1 tablet (50 mg total) by mouth every evening. Take 50 mg by mouth every evening.        STOP taking these medications    cyanocobalamin 1,000 mcg/mL  injection     cyclobenzaprine 10 MG tablet  Commonly known as: FLEXERIL     diphenhydrAMINE 25 mg capsule  Commonly known as: BENADRYL     hydrOXYzine HCL 10 MG Tab  Commonly known as: ATARAX     LIDOcaine HCL 2% 2 % jelly  Commonly known as: XYLOCAINE     miconazole 2 % cream  Commonly known as: MICOTIN     predniSONE 10 MG tablet  Commonly known as: DELTASONE     traMADoL 50 mg tablet  Commonly known as: ULTRAM           Where to Get Your Medications      Information about where to get these medications is not yet available    Ask your nurse or doctor about these medications  · meropenem-0.9% sodium chloride 500 mg/50 mL Pgbk         Instructions:  1. Take all medications as prescribed  2. Keep all follow-up appointments  3. Return to the hospital or call your primary care physicians for any new, worsening, or recurrent symptoms.    Follow-Up:   Follow-up Information     Damari Barber Jr, MD. Schedule an appointment as soon as possible for a visit.    Specialty: Urology  Contact information:  [address] [place] [address]  [address]  [place] [address]  [phone]             April Horton MD Follow up.    Specialty: Internal Medicine  Contact information:  [address] [facility]  [address]  [place] [address]  [phone]             Raúl Dietz MD Follow up.    Specialty: Nephrology  Contact information:  [address] [facility] [place]  [place] [address]  [phone]                         Time spent on discharge total time 38 minutes    Porfirio Hurst MD  1:50 PM  06/25/2022             independent

## 2022-07-22 NOTE — PRE-OP CHECKLIST - HEIGHT IN FEET
5 Cosentyx Counseling:  I discussed with the patient the risks of Cosentyx including but not limited to worsening of Crohn's disease, immunosuppression, allergic reactions and infections.  The patient understands that monitoring is required including a PPD at baseline and must alert us or the primary physician if symptoms of infection or other concerning signs are noted.

## 2022-12-27 NOTE — PATIENT PROFILE ADULT. - AGENT'S NAME
Outreach attempts to coordinate scheduling on the patient's Service to Dermatology order in workqueue [id number] requested on 12/16/2022 have been conducted.  Please contact Gagan if further coordination is needed.    
Nakia
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
## 2025-06-06 NOTE — PRE-OP CHECKLIST - HAND OFF
64 year-old man with a history of hyperlipidemia, liver cirrhosis presumed secondary to alcohol use disorder (abstinent for 1–2 years), hypothyroidism, gastroesophageal reflux disease, and prior Helicobacter pylori infection (untreated), presents for evaluation of shortness of breath due to abdominal distention and large ascites    GI consult: Ascites  Thoracentesis by ICU - 1200 ml removed  Paracentesis today 6/06 by Dr Jovi Dyer consult from the hepatology team - Outpatient f/u for liver transplant / TIPS procedure      Amended: Post Paracentesis, Fluid removal 1500ml  yes
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
